# Patient Record
Sex: FEMALE | Race: BLACK OR AFRICAN AMERICAN | NOT HISPANIC OR LATINO
[De-identification: names, ages, dates, MRNs, and addresses within clinical notes are randomized per-mention and may not be internally consistent; named-entity substitution may affect disease eponyms.]

---

## 2016-07-26 RX ORDER — GABAPENTIN 400 MG/1
1 CAPSULE ORAL
Qty: 0 | Refills: 0 | DISCHARGE
Start: 2016-07-26

## 2017-02-14 ENCOUNTER — APPOINTMENT (OUTPATIENT)
Dept: ORTHOPEDIC SURGERY | Facility: CLINIC | Age: 82
End: 2017-02-14

## 2017-02-14 VITALS
HEART RATE: 79 BPM | DIASTOLIC BLOOD PRESSURE: 70 MMHG | HEIGHT: 59 IN | WEIGHT: 215 LBS | SYSTOLIC BLOOD PRESSURE: 115 MMHG | BODY MASS INDEX: 43.34 KG/M2

## 2017-02-14 DIAGNOSIS — T84.9XXA UNSPECIFIED COMPLICATION OF INTERNAL ORTHOPEDIC PROSTHETIC DEVICE, IMPLANT AND GRAFT, INITIAL ENCOUNTER: ICD-10-CM

## 2017-03-26 ENCOUNTER — RESULT REVIEW (OUTPATIENT)
Age: 82
End: 2017-03-26

## 2019-04-17 ENCOUNTER — RESULT REVIEW (OUTPATIENT)
Age: 84
End: 2019-04-17

## 2019-05-01 ENCOUNTER — APPOINTMENT (OUTPATIENT)
Dept: SURGICAL ONCOLOGY | Facility: CLINIC | Age: 84
End: 2019-05-01
Payer: MEDICARE

## 2019-05-01 VITALS
TEMPERATURE: 98.5 F | OXYGEN SATURATION: 95 % | WEIGHT: 215 LBS | SYSTOLIC BLOOD PRESSURE: 167 MMHG | HEART RATE: 76 BPM | RESPIRATION RATE: 17 BRPM | DIASTOLIC BLOOD PRESSURE: 82 MMHG | BODY MASS INDEX: 43.34 KG/M2 | HEIGHT: 59 IN

## 2019-05-01 DIAGNOSIS — N40.0 BENIGN PROSTATIC HYPERPLASIA WITHOUT LOWER URINARY TRACT SYMPMS: ICD-10-CM

## 2019-05-01 DIAGNOSIS — Z80.0 FAMILY HISTORY OF MALIGNANT NEOPLASM OF DIGESTIVE ORGANS: ICD-10-CM

## 2019-05-01 DIAGNOSIS — R01.1 CARDIAC MURMUR, UNSPECIFIED: ICD-10-CM

## 2019-05-01 DIAGNOSIS — Z86.79 PERSONAL HISTORY OF OTHER DISEASES OF THE CIRCULATORY SYSTEM: ICD-10-CM

## 2019-05-01 DIAGNOSIS — E11.9 TYPE 2 DIABETES MELLITUS W/OUT COMPLICATIONS: ICD-10-CM

## 2019-05-01 DIAGNOSIS — Z86.69 PERSONAL HISTORY OF OTHER DISEASES OF THE NERVOUS SYSTEM AND SENSE ORGANS: ICD-10-CM

## 2019-05-01 DIAGNOSIS — Z80.1 FAMILY HISTORY OF MALIGNANT NEOPLASM OF TRACHEA, BRONCHUS AND LUNG: ICD-10-CM

## 2019-05-01 DIAGNOSIS — M19.90 UNSPECIFIED OSTEOARTHRITIS, UNSPECIFIED SITE: ICD-10-CM

## 2019-05-01 DIAGNOSIS — Z85.3 PERSONAL HISTORY OF MALIGNANT NEOPLASM OF BREAST: ICD-10-CM

## 2019-05-01 DIAGNOSIS — Z78.9 OTHER SPECIFIED HEALTH STATUS: ICD-10-CM

## 2019-05-01 PROCEDURE — 99205 OFFICE O/P NEW HI 60 MIN: CPT

## 2019-05-01 RX ORDER — TAFLUPROST 0.01 MG/ML
0 SOLUTION/ DROPS OPHTHALMIC
Refills: 0 | Status: ACTIVE | COMMUNITY

## 2019-05-01 RX ORDER — BRIMONIDINE TARTRATE 2 MG/MG
0.2 SOLUTION/ DROPS OPHTHALMIC
Qty: 10 | Refills: 0 | Status: ACTIVE | COMMUNITY
Start: 2019-02-05

## 2019-05-01 RX ORDER — ASPIRIN 81 MG
81 TABLET, DELAYED RELEASE (ENTERIC COATED) ORAL
Refills: 0 | Status: ACTIVE | COMMUNITY

## 2019-05-01 RX ORDER — AMLODIPINE BESYLATE 5 MG/1
TABLET ORAL
Refills: 0 | Status: ACTIVE | COMMUNITY

## 2019-05-01 NOTE — ASSESSMENT
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

## 2019-05-01 NOTE — CONSULT LETTER
[Dear  ___] : Dear  [unfilled], [Consult Letter:] : I had the pleasure of evaluating your patient, [unfilled]. [Please see my note below.] : Please see my note below. [Consult Closing:] : Thank you very much for allowing me to participate in the care of this patient.  If you have any questions, please do not hesitate to contact me. [Sincerely,] : Sincerely, [FreeTextEntry1] : I will keep you informed of the final pathology. [FreeTextEntry3] : Bi Mcneill MD FACS\par Chief of Surgical Oncology\par \par  [DrJustine  ___] : Dr. HUSAIN

## 2019-05-01 NOTE — PHYSICAL EXAM
[Normal] : supple, no neck mass and thyroid not enlarged [Normal Supraclavicular Lymph Nodes] : normal supraclavicular lymph nodes [Normal Axillary Lymph Nodes] : normal axillary lymph nodes [Normal] : grossly intact [de-identified] : No palpable masses bilateral breasts. [FreeTextEntry1] : Deferred

## 2019-05-01 NOTE — HISTORY OF PRESENT ILLNESS
[de-identified] : Ms. Woodson is a 86 year old female who presents today for an initial consultation.  She  was referred by Dr. Aileen Grossman\par \par Breana states that she has been experiencing occasional Left breast discomfort for approximately 1 year with normal breast imaging last year.  She underwent a mammo/sono on 4/8/19 and was noted with a new 1.7cm spiculated mass in the Left breast 1:00 (Birads 5).  She underwent a US guided biopsy which revealed a Triple Negative invasive poorly differentiated ductal carcinoma measuring at least 11mm.  She then underwent a breast MRI and was noted with nonspecific Left axillary tail lymph nodes for which targeted US evaluation is recommended with potential biopsy (Birads 6).  Today she is without any complaints. Denies palpable breast masses, nipple discharge, skin changes, inversion or breast pain. Denies constitutional symptoms.\par  \par PMH otherwise significant for HTN, DM with neuropathy, glaucoma and irritable bladder.\par Denies family history of breast or ovarian cancer.  Family history of colon cancer in her mother and lung cancer in her father and brother.\par \par Internist:  Dr. Chyna Grady

## 2019-05-09 ENCOUNTER — OUTPATIENT (OUTPATIENT)
Dept: OUTPATIENT SERVICES | Facility: HOSPITAL | Age: 84
LOS: 1 days | End: 2019-05-09
Payer: COMMERCIAL

## 2019-05-09 VITALS
OXYGEN SATURATION: 97 % | RESPIRATION RATE: 16 BRPM | HEART RATE: 88 BPM | DIASTOLIC BLOOD PRESSURE: 78 MMHG | SYSTOLIC BLOOD PRESSURE: 159 MMHG | HEIGHT: 57 IN | WEIGHT: 216.93 LBS | TEMPERATURE: 98 F

## 2019-05-09 DIAGNOSIS — Z98.49 CATARACT EXTRACTION STATUS, UNSPECIFIED EYE: Chronic | ICD-10-CM

## 2019-05-09 DIAGNOSIS — Z96.659 PRESENCE OF UNSPECIFIED ARTIFICIAL KNEE JOINT: Chronic | ICD-10-CM

## 2019-05-09 DIAGNOSIS — Z96.611 PRESENCE OF RIGHT ARTIFICIAL SHOULDER JOINT: Chronic | ICD-10-CM

## 2019-05-09 DIAGNOSIS — Z86.79 PERSONAL HISTORY OF OTHER DISEASES OF THE CIRCULATORY SYSTEM: ICD-10-CM

## 2019-05-09 DIAGNOSIS — Z96.7 PRESENCE OF OTHER BONE AND TENDON IMPLANTS: Chronic | ICD-10-CM

## 2019-05-09 DIAGNOSIS — C50.912 MALIGNANT NEOPLASM OF UNSPECIFIED SITE OF LEFT FEMALE BREAST: ICD-10-CM

## 2019-05-09 DIAGNOSIS — Z98.89 OTHER SPECIFIED POSTPROCEDURAL STATES: Chronic | ICD-10-CM

## 2019-05-09 DIAGNOSIS — Z01.818 ENCOUNTER FOR OTHER PREPROCEDURAL EXAMINATION: ICD-10-CM

## 2019-05-09 DIAGNOSIS — Z96.641 PRESENCE OF RIGHT ARTIFICIAL HIP JOINT: Chronic | ICD-10-CM

## 2019-05-09 DIAGNOSIS — Z90.89 ACQUIRED ABSENCE OF OTHER ORGANS: Chronic | ICD-10-CM

## 2019-05-09 DIAGNOSIS — E11.9 TYPE 2 DIABETES MELLITUS WITHOUT COMPLICATIONS: ICD-10-CM

## 2019-05-09 LAB
ANION GAP SERPL CALC-SCNC: 12 MMOL/L — SIGNIFICANT CHANGE UP (ref 5–17)
BUN SERPL-MCNC: 26 MG/DL — HIGH (ref 7–23)
CALCIUM SERPL-MCNC: 10.2 MG/DL — SIGNIFICANT CHANGE UP (ref 8.4–10.5)
CHLORIDE SERPL-SCNC: 103 MMOL/L — SIGNIFICANT CHANGE UP (ref 96–108)
CO2 SERPL-SCNC: 24 MMOL/L — SIGNIFICANT CHANGE UP (ref 22–31)
CREAT SERPL-MCNC: 1.2 MG/DL — SIGNIFICANT CHANGE UP (ref 0.5–1.3)
GLUCOSE SERPL-MCNC: 235 MG/DL — HIGH (ref 70–99)
POTASSIUM SERPL-MCNC: 5 MMOL/L — SIGNIFICANT CHANGE UP (ref 3.5–5.3)
POTASSIUM SERPL-SCNC: 5 MMOL/L — SIGNIFICANT CHANGE UP (ref 3.5–5.3)
SODIUM SERPL-SCNC: 139 MMOL/L — SIGNIFICANT CHANGE UP (ref 135–145)

## 2019-05-09 PROCEDURE — 83036 HEMOGLOBIN GLYCOSYLATED A1C: CPT

## 2019-05-09 PROCEDURE — 85027 COMPLETE CBC AUTOMATED: CPT

## 2019-05-09 PROCEDURE — 80048 BASIC METABOLIC PNL TOTAL CA: CPT

## 2019-05-09 PROCEDURE — G0463: CPT

## 2019-05-09 RX ORDER — CEFAZOLIN SODIUM 1 G
2000 VIAL (EA) INJECTION ONCE
Refills: 0 | Status: DISCONTINUED | OUTPATIENT
Start: 2019-05-15 | End: 2019-05-30

## 2019-05-09 RX ORDER — LIDOCAINE HCL 20 MG/ML
0.2 VIAL (ML) INJECTION ONCE
Refills: 0 | Status: DISCONTINUED | OUTPATIENT
Start: 2019-05-15 | End: 2019-05-30

## 2019-05-09 RX ORDER — SODIUM CHLORIDE 9 MG/ML
3 INJECTION INTRAMUSCULAR; INTRAVENOUS; SUBCUTANEOUS EVERY 8 HOURS
Refills: 0 | Status: DISCONTINUED | OUTPATIENT
Start: 2019-05-15 | End: 2019-05-30

## 2019-05-09 NOTE — H&P PST ADULT - LYMPHATIC
anterior cervical L/anterior cervical R/supraclavicular R/posterior cervical L/posterior cervical R/supraclavicular L

## 2019-05-09 NOTE — H&P PST ADULT - HISTORY OF PRESENT ILLNESS
85 y/o female H/O HTN, T2DM, OA, C/O left breat discomfort x 1 year, s/p mammogram 4/2019 and fount to have left breast malignant neoplasm and now scheduled for Left Breast Khadijah  Localized Lumpectomy, Left [place] Node Biopsy on 5/15/19. Denies any palpitations, SOB, N/V, fever or chills.

## 2019-05-09 NOTE — H&P PST ADULT - NSICDXPASTSURGICALHX_GEN_ALL_CORE_FT
PAST SURGICAL HISTORY:  H/O total shoulder replacement, right 2016    History of hip replacement, total, right     S/P carpal tunnel release right 15 years ago    S/P cataract extraction bilateral 7 years ago    S/P eye surgery left eye glaucoma surgery, 4 years ago    S/P knee replacement right 1992    S/P knee replacement left 1992    S/P ORIF (open reduction internal fixation) fracture left ankle 2013    S/P T&A (status post tonsillectomy and adenoidectomy) as child

## 2019-05-09 NOTE — H&P PST ADULT - NSICDXPROBLEM_GEN_ALL_CORE_FT
PROBLEM DIAGNOSES  Problem: Malignant neoplasm of left breast  Assessment and Plan: Left Breast Khadijah  Localized Lumpectomy, Left [place] Node Biopsy on 5/15/19  Pre-op education provided - all questions answered     Problem: H/O: HTN (hypertension)  Assessment and Plan: continue on antihypertensive medications     Problem: T2DM (type 2 diabetes mellitus)  Assessment and Plan: Finger stick on day of surgery

## 2019-05-09 NOTE — H&P PST ADULT - RS GEN PE MLT RESP DETAILS PC
clear to auscultation bilaterally/respirations non-labored/no wheezes/breath sounds equal/good air movement/no rales/no rhonchi/airway patent

## 2019-05-09 NOTE — H&P PST ADULT - NSICDXPASTMEDICALHX_GEN_ALL_CORE_FT
PAST MEDICAL HISTORY:  Diabetic nephropathy     Glaucoma     Heart murmur     Hyperlipidemia     Hypertension     Malignant neoplasm of left breast     Neuropathy bilateral hands    Osteoarthritis     Overactive bladder     Renal insufficiency     Type 2 diabetes mellitus PAST MEDICAL HISTORY:  Diabetic nephropathy     Glaucoma     Heart murmur     Hyperlipidemia     Hypertension     Malignant neoplasm of left breast     Morbidly obese BMI 47    Neuropathy bilateral hands    Osteoarthritis     Overactive bladder     Renal insufficiency     Type 2 diabetes mellitus

## 2019-05-09 NOTE — H&P PST ADULT - NSANTHOSAYNRD_GEN_A_CORE
No. OSCAR screening performed.  STOP BANG Legend: 0-2 = LOW Risk; 3-4 = INTERMEDIATE Risk; 5-8 = HIGH Risk

## 2019-05-10 LAB
HBA1C BLD-MCNC: 6.5 % — HIGH (ref 4–5.6)
HCT VFR BLD CALC: 46.2 % — HIGH (ref 34.5–45)
HGB BLD-MCNC: 13.8 G/DL — SIGNIFICANT CHANGE UP (ref 11.5–15.5)
MCHC RBC-ENTMCNC: 28 PG — SIGNIFICANT CHANGE UP (ref 27–34)
MCHC RBC-ENTMCNC: 29.9 GM/DL — LOW (ref 32–36)
MCV RBC AUTO: 93.7 FL — SIGNIFICANT CHANGE UP (ref 80–100)
PLATELET # BLD AUTO: 152 K/UL — SIGNIFICANT CHANGE UP (ref 150–400)
RBC # BLD: 4.93 M/UL — SIGNIFICANT CHANGE UP (ref 3.8–5.2)
RBC # FLD: 13.9 % — SIGNIFICANT CHANGE UP (ref 10.3–14.5)
WBC # BLD: 6.22 K/UL — SIGNIFICANT CHANGE UP (ref 3.8–10.5)
WBC # FLD AUTO: 6.22 K/UL — SIGNIFICANT CHANGE UP (ref 3.8–10.5)

## 2019-05-13 ENCOUNTER — FORM ENCOUNTER (OUTPATIENT)
Age: 84
End: 2019-05-13

## 2019-05-14 ENCOUNTER — OUTPATIENT (OUTPATIENT)
Dept: OUTPATIENT SERVICES | Facility: HOSPITAL | Age: 84
LOS: 1 days | End: 2019-05-14
Payer: COMMERCIAL

## 2019-05-14 ENCOUNTER — FORM ENCOUNTER (OUTPATIENT)
Age: 84
End: 2019-05-14

## 2019-05-14 ENCOUNTER — APPOINTMENT (OUTPATIENT)
Dept: ULTRASOUND IMAGING | Facility: IMAGING CENTER | Age: 84
End: 2019-05-14
Payer: MEDICARE

## 2019-05-14 ENCOUNTER — TRANSCRIPTION ENCOUNTER (OUTPATIENT)
Age: 84
End: 2019-05-14

## 2019-05-14 DIAGNOSIS — Z96.7 PRESENCE OF OTHER BONE AND TENDON IMPLANTS: Chronic | ICD-10-CM

## 2019-05-14 DIAGNOSIS — Z00.8 ENCOUNTER FOR OTHER GENERAL EXAMINATION: ICD-10-CM

## 2019-05-14 DIAGNOSIS — Z96.611 PRESENCE OF RIGHT ARTIFICIAL SHOULDER JOINT: Chronic | ICD-10-CM

## 2019-05-14 DIAGNOSIS — Z90.89 ACQUIRED ABSENCE OF OTHER ORGANS: Chronic | ICD-10-CM

## 2019-05-14 DIAGNOSIS — Z98.89 OTHER SPECIFIED POSTPROCEDURAL STATES: Chronic | ICD-10-CM

## 2019-05-14 DIAGNOSIS — Z96.659 PRESENCE OF UNSPECIFIED ARTIFICIAL KNEE JOINT: Chronic | ICD-10-CM

## 2019-05-14 DIAGNOSIS — Z96.641 PRESENCE OF RIGHT ARTIFICIAL HIP JOINT: Chronic | ICD-10-CM

## 2019-05-14 DIAGNOSIS — Z98.49 CATARACT EXTRACTION STATUS, UNSPECIFIED EYE: Chronic | ICD-10-CM

## 2019-05-14 PROBLEM — E66.01 MORBID (SEVERE) OBESITY DUE TO EXCESS CALORIES: Chronic | Status: ACTIVE | Noted: 2019-05-09

## 2019-05-14 PROBLEM — C50.912 MALIGNANT NEOPLASM OF UNSPECIFIED SITE OF LEFT FEMALE BREAST: Chronic | Status: ACTIVE | Noted: 2019-05-09

## 2019-05-14 PROCEDURE — 19285 PERQ DEV BREAST 1ST US IMAG: CPT | Mod: LT

## 2019-05-14 PROCEDURE — 19285 PERQ DEV BREAST 1ST US IMAG: CPT

## 2019-05-14 PROCEDURE — C1739: CPT

## 2019-05-14 RX ORDER — SODIUM CHLORIDE 9 MG/ML
1000 INJECTION, SOLUTION INTRAVENOUS
Refills: 0 | Status: DISCONTINUED | OUTPATIENT
Start: 2019-05-15 | End: 2019-05-30

## 2019-05-14 RX ORDER — ONDANSETRON 8 MG/1
4 TABLET, FILM COATED ORAL ONCE
Refills: 0 | Status: DISCONTINUED | OUTPATIENT
Start: 2019-05-15 | End: 2019-05-30

## 2019-05-15 ENCOUNTER — RESULT REVIEW (OUTPATIENT)
Age: 84
End: 2019-05-15

## 2019-05-15 ENCOUNTER — OUTPATIENT (OUTPATIENT)
Dept: OUTPATIENT SERVICES | Facility: HOSPITAL | Age: 84
LOS: 1 days | End: 2019-05-15
Payer: COMMERCIAL

## 2019-05-15 ENCOUNTER — APPOINTMENT (OUTPATIENT)
Dept: SURGICAL ONCOLOGY | Facility: HOSPITAL | Age: 84
End: 2019-05-15

## 2019-05-15 ENCOUNTER — APPOINTMENT (OUTPATIENT)
Dept: NUCLEAR MEDICINE | Facility: HOSPITAL | Age: 84
End: 2019-05-15

## 2019-05-15 VITALS
RESPIRATION RATE: 16 BRPM | OXYGEN SATURATION: 97 % | WEIGHT: 216.93 LBS | SYSTOLIC BLOOD PRESSURE: 159 MMHG | HEART RATE: 88 BPM | DIASTOLIC BLOOD PRESSURE: 83 MMHG | HEIGHT: 57 IN | TEMPERATURE: 98 F

## 2019-05-15 VITALS
OXYGEN SATURATION: 99 % | DIASTOLIC BLOOD PRESSURE: 77 MMHG | HEART RATE: 65 BPM | SYSTOLIC BLOOD PRESSURE: 147 MMHG | RESPIRATION RATE: 12 BRPM

## 2019-05-15 DIAGNOSIS — Z98.89 OTHER SPECIFIED POSTPROCEDURAL STATES: Chronic | ICD-10-CM

## 2019-05-15 DIAGNOSIS — Z90.89 ACQUIRED ABSENCE OF OTHER ORGANS: Chronic | ICD-10-CM

## 2019-05-15 DIAGNOSIS — Z96.7 PRESENCE OF OTHER BONE AND TENDON IMPLANTS: Chronic | ICD-10-CM

## 2019-05-15 DIAGNOSIS — Z96.659 PRESENCE OF UNSPECIFIED ARTIFICIAL KNEE JOINT: Chronic | ICD-10-CM

## 2019-05-15 DIAGNOSIS — Z98.49 CATARACT EXTRACTION STATUS, UNSPECIFIED EYE: Chronic | ICD-10-CM

## 2019-05-15 DIAGNOSIS — Z96.641 PRESENCE OF RIGHT ARTIFICIAL HIP JOINT: Chronic | ICD-10-CM

## 2019-05-15 DIAGNOSIS — Z96.611 PRESENCE OF RIGHT ARTIFICIAL SHOULDER JOINT: Chronic | ICD-10-CM

## 2019-05-15 DIAGNOSIS — C50.912 MALIGNANT NEOPLASM OF UNSPECIFIED SITE OF LEFT FEMALE BREAST: ICD-10-CM

## 2019-05-15 PROCEDURE — 14000 TIS TRNFR TRUNK 10 SQ CM/<: CPT

## 2019-05-15 PROCEDURE — 19301 PARTIAL MASTECTOMY: CPT | Mod: LT

## 2019-05-15 PROCEDURE — 76098 X-RAY EXAM SURGICAL SPECIMEN: CPT | Mod: 26

## 2019-05-15 PROCEDURE — 19302 P-MASTECTOMY W/LN REMOVAL: CPT | Mod: LT

## 2019-05-15 PROCEDURE — 38308 INCISION OF LYMPH CHANNELS: CPT

## 2019-05-15 PROCEDURE — 38792 RA TRACER ID OF SENTINL NODE: CPT

## 2019-05-15 PROCEDURE — 88305 TISSUE EXAM BY PATHOLOGIST: CPT

## 2019-05-15 PROCEDURE — 38900 IO MAP OF SENT LYMPH NODE: CPT | Mod: LT

## 2019-05-15 PROCEDURE — 38525 BIOPSY/REMOVAL LYMPH NODES: CPT | Mod: LT

## 2019-05-15 PROCEDURE — 38792 RA TRACER ID OF SENTINL NODE: CPT | Mod: 59,LT

## 2019-05-15 PROCEDURE — 88307 TISSUE EXAM BY PATHOLOGIST: CPT | Mod: 26

## 2019-05-15 PROCEDURE — 88305 TISSUE EXAM BY PATHOLOGIST: CPT | Mod: 26

## 2019-05-15 PROCEDURE — A9541: CPT

## 2019-05-15 PROCEDURE — 88307 TISSUE EXAM BY PATHOLOGIST: CPT

## 2019-05-15 PROCEDURE — 76098 X-RAY EXAM SURGICAL SPECIMEN: CPT

## 2019-05-15 RX ORDER — ACETAMINOPHEN WITH CODEINE 300MG-30MG
1 TABLET ORAL
Qty: 12 | Refills: 0
Start: 2019-05-15

## 2019-05-15 RX ORDER — APREPITANT 80 MG/1
40 CAPSULE ORAL ONCE
Refills: 0 | Status: COMPLETED | OUTPATIENT
Start: 2019-05-15 | End: 2019-05-15

## 2019-05-15 RX ADMIN — SODIUM CHLORIDE 3 MILLILITER(S): 9 INJECTION INTRAMUSCULAR; INTRAVENOUS; SUBCUTANEOUS at 12:00

## 2019-05-15 RX ADMIN — APREPITANT 40 MILLIGRAM(S): 80 CAPSULE ORAL at 12:00

## 2019-05-15 NOTE — BRIEF OPERATIVE NOTE - OPERATION/FINDINGS
Left breast lumpectomy with left axillary lymphadenectomy. Khadijah  localization for lumpectomy. Blue dye and radiotracer ID of axillary lymph nodes.

## 2019-05-15 NOTE — ASU PATIENT PROFILE, ADULT - PMH
Diabetic nephropathy    Glaucoma    Heart murmur    Hyperlipidemia    Hypertension    Malignant neoplasm of left breast    Morbidly obese  BMI 47  Neuropathy  bilateral hands  Osteoarthritis    Overactive bladder    Renal insufficiency    Type 2 diabetes mellitus

## 2019-05-15 NOTE — ASU DISCHARGE PLAN (ADULT/PEDIATRIC) - CALL YOUR DOCTOR IF YOU HAVE ANY OF THE FOLLOWING:
Swelling that gets worse/Pain not relieved by Medications/Bleeding that does not stop/Fever greater than (need to indicate Fahrenheit or Celsius)/Wound/Surgical Site with redness, or foul smelling discharge or pus

## 2019-05-15 NOTE — ASU DISCHARGE PLAN (ADULT/PEDIATRIC) - CARE PROVIDER_API CALL
Bi Mcneill (MD)  Surgery  [address] [place] [id number]  Phone: [phone]  Fax: [phone]  Follow Up Time:

## 2019-05-15 NOTE — ASU DISCHARGE PLAN (ADULT/PEDIATRIC) - ASU DC SPECIAL INSTRUCTIONSFT
WOUND CARE:  Please keep incisions clean and dry. Please do not Scrub or rub incisions. Do not use lotion or powder on incisions.   BATHING: Please do not submerge wound underwater. You may shower and/or sponge bathe.  ACTIVITY: No heavy lifting or straining until your follow up appointment. Otherwise, you may return to your usual level of physical activity. If you are taking narcotic pain medication (such as Percocet) DO NOT drive a car, operate machinery or make important decisions.  DIET: Return to your usual diet.  NOTIFY YOUR SURGEON IF: You have any bleeding that does not stop, any pus draining from your wound(s), any fever (over 100.4 F) or chills, persistent nausea/vomiting, persistent diarrhea, or if your pain is not controlled on your discharge pain medications.  FOLLOW-UP: Please follow-up with your surgeon 2 weeks following discharge- please call to schedule an appointment.   PAIN CONTROL: A prescription for Tylenol with codeine has been sent to your pharmacy ([facility], [address]). You should only take these for severe pain. For mild or moderate pain, you may take ibuprofen 400mg every 6 hours.

## 2019-05-15 NOTE — ASU PATIENT PROFILE, ADULT - PSH
H/O total shoulder replacement, right  2016  History of hip replacement, total, right    S/P carpal tunnel release  right 15 years ago  S/P cataract extraction  bilateral 7 years ago  S/P eye surgery  left eye glaucoma surgery, 4 years ago  S/P knee replacement  left 1992  S/P knee replacement  right 1992  S/P ORIF (open reduction internal fixation) fracture  left ankle 2013  S/P T&A (status post tonsillectomy and adenoidectomy)  as child

## 2019-05-21 LAB — SURGICAL PATHOLOGY STUDY: SIGNIFICANT CHANGE UP

## 2019-05-28 ENCOUNTER — APPOINTMENT (OUTPATIENT)
Dept: SURGICAL ONCOLOGY | Facility: CLINIC | Age: 84
End: 2019-05-28
Payer: MEDICARE

## 2019-05-28 VITALS
HEART RATE: 94 BPM | SYSTOLIC BLOOD PRESSURE: 138 MMHG | WEIGHT: 215 LBS | BODY MASS INDEX: 43.34 KG/M2 | HEIGHT: 59 IN | RESPIRATION RATE: 15 BRPM | DIASTOLIC BLOOD PRESSURE: 80 MMHG

## 2019-05-28 PROCEDURE — 99214 OFFICE O/P EST MOD 30 MIN: CPT | Mod: 24

## 2019-05-28 NOTE — CONSULT LETTER
[Dear  ___] : Dear  [unfilled], [Courtesy Letter:] : I had the pleasure of seeing your patient, [unfilled], in my office today. [Please see my note below.] : Please see my note below. [Consult Closing:] : Thank you very much for allowing me to participate in the care of this patient.  If you have any questions, please do not hesitate to contact me. [FreeTextEntry1] : I will keep you informed of my follow-up. [Sincerely,] : Sincerely, [FreeTextEntry3] : Bi Mcneill MD FACS\par Chief of Surgical Oncology\par \par  [DrJustine  ___] : Dr. HUSAIN

## 2019-05-28 NOTE — HISTORY OF PRESENT ILLNESS
[de-identified] : 86 year-old female presents for an initial postop visit, status post left breast lumpectomy and SLNB on 5/15/19.  Final pathology was 1.6 cm invasive ductal carcinoma and DCIS, with 6 benign sentinel lymph nodes and negative margins (T1cN0, ER/WI/HER2 negative).  She denies significant pain, fever, chills, erythema or drainage from the incisions. \par \par Previous pertinent history is as follows:\par \par Breana states that she has been experiencing occasional Left breast discomfort for approximately 1 year with normal breast imaging last year.  She underwent a mammo/sono on 4/8/19 and was noted with a new 1.7cm spiculated mass in the Left breast 1:00 (Birads 5).  She underwent a US guided biopsy which revealed a Triple Negative invasive poorly differentiated ductal carcinoma measuring at least 11mm.  She then underwent a breast MRI and was noted with nonspecific Left axillary tail lymph nodes for which targeted US evaluation is recommended with potential biopsy (Birads 6).  Today she is without any complaints. Denies palpable breast masses, nipple discharge, skin changes, inversion or breast pain. Denies constitutional symptoms.\par  \par PMH otherwise significant for HTN, DM with neuropathy, glaucoma and irritable bladder.\par Denies family history of breast or ovarian cancer.  Family history of colon cancer in her mother and lung cancer in her father and brother.\par \par Internist:  Dr. Chyna Grady

## 2019-05-28 NOTE — PHYSICAL EXAM
[de-identified] : Left breast and left axillary incisions healing well with no evidence of infection, hematoma or seroma.

## 2019-05-28 NOTE — ASSESSMENT
[FreeTextEntry1] : IMP:\par S/p left breast lumpectomy for stage 1 triple negative left breast cancer\par \par PLAN:\par Adjuvant RT\par No role for endocrine therapy\par Adjuvant chemotherapy discussed and declined by pt. and family\par RTO q3 months with bloodwork

## 2019-05-31 ENCOUNTER — OUTPATIENT (OUTPATIENT)
Dept: OUTPATIENT SERVICES | Facility: HOSPITAL | Age: 84
LOS: 1 days | Discharge: HOME | End: 2019-05-31
Payer: MEDICARE

## 2019-05-31 DIAGNOSIS — Z96.7 PRESENCE OF OTHER BONE AND TENDON IMPLANTS: Chronic | ICD-10-CM

## 2019-05-31 DIAGNOSIS — Z96.641 PRESENCE OF RIGHT ARTIFICIAL HIP JOINT: Chronic | ICD-10-CM

## 2019-05-31 DIAGNOSIS — Z98.49 CATARACT EXTRACTION STATUS, UNSPECIFIED EYE: Chronic | ICD-10-CM

## 2019-05-31 DIAGNOSIS — Z96.611 PRESENCE OF RIGHT ARTIFICIAL SHOULDER JOINT: Chronic | ICD-10-CM

## 2019-05-31 DIAGNOSIS — Z90.89 ACQUIRED ABSENCE OF OTHER ORGANS: Chronic | ICD-10-CM

## 2019-05-31 DIAGNOSIS — Z96.659 PRESENCE OF UNSPECIFIED ARTIFICIAL KNEE JOINT: Chronic | ICD-10-CM

## 2019-05-31 DIAGNOSIS — Z98.89 OTHER SPECIFIED POSTPROCEDURAL STATES: Chronic | ICD-10-CM

## 2019-06-04 ENCOUNTER — APPOINTMENT (OUTPATIENT)
Dept: RADIATION ONCOLOGY | Facility: CLINIC | Age: 84
End: 2019-06-04
Payer: MEDICARE

## 2019-06-04 VITALS
HEART RATE: 70 BPM | DIASTOLIC BLOOD PRESSURE: 82 MMHG | OXYGEN SATURATION: 95 % | SYSTOLIC BLOOD PRESSURE: 173 MMHG | RESPIRATION RATE: 16 BRPM | BODY MASS INDEX: 43.43 KG/M2 | WEIGHT: 215 LBS

## 2019-06-04 DIAGNOSIS — Z80.0 FAMILY HISTORY OF MALIGNANT NEOPLASM OF DIGESTIVE ORGANS: ICD-10-CM

## 2019-06-04 DIAGNOSIS — N32.89 OTHER SPECIFIED DISORDERS OF BLADDER: ICD-10-CM

## 2019-06-04 PROCEDURE — 99204 OFFICE O/P NEW MOD 45 MIN: CPT | Mod: 25

## 2019-06-04 PROCEDURE — 77263 THER RADIOLOGY TX PLNG CPLX: CPT

## 2019-06-04 NOTE — OB/GYN HISTORY
[Currently In Menopause] : currently in menopause [Menopause Age: ____] : patient was [unfilled] years old at menopause [___] : Living: [unfilled]

## 2019-06-06 PROCEDURE — 77290 THER RAD SIMULAJ FIELD CPLX: CPT | Mod: 26

## 2019-06-06 PROCEDURE — 77333 RADIATION TREATMENT AID(S): CPT | Mod: 26

## 2019-06-13 PROCEDURE — 77334 RADIATION TREATMENT AID(S): CPT | Mod: 26

## 2019-06-13 PROCEDURE — 77300 RADIATION THERAPY DOSE PLAN: CPT | Mod: 26

## 2019-06-13 PROCEDURE — 77295 3-D RADIOTHERAPY PLAN: CPT | Mod: 26

## 2019-06-13 NOTE — VITALS
[Maximal Pain Intensity: 0/10] : 0/10 [Least Pain Intensity: 0/10] : 0/10 [5 - Distress Level] : Distress Level: 5 [80: Normal activity with effort; some signs or symptoms of disease.] : 80: Normal activity with effort; some signs or symptoms of disease.

## 2019-06-14 PROCEDURE — 77280 THER RAD SIMULAJ FIELD SMPL: CPT | Mod: 26

## 2019-06-17 NOTE — REVIEW OF SYSTEMS
[Genital Edema: Grade 0] : Genital Edema: Grade 0 [Vaginal Stricture: Grade 0] : Vaginal Stricture: Grade 0 [Breast Pain: Grade 0] : Breast Pain: Grade 0

## 2019-06-21 PROCEDURE — 77427 RADIATION TX MANAGEMENT X5: CPT

## 2019-06-24 NOTE — PHYSICAL EXAM
[General Appearance - Alert] : alert [General Appearance - In No Acute Distress] : in no acute distress [Skin Color & Pigmentation] : normal skin color and pigmentation [Obese] : obese [Oriented To Time, Place, And Person] : oriented to person, place, and time

## 2019-06-28 PROCEDURE — 77427 RADIATION TX MANAGEMENT X5: CPT

## 2019-06-30 NOTE — DISEASE MANAGEMENT
[Pathological] : TNM Stage: p [I] : I [TTNM] : 1c [NTNM] : 0 [MTNM] : x [de-identified] : 0 fx [de-identified] : 20 fx [de-identified] : left breast

## 2019-06-30 NOTE — PHYSICAL EXAM
[Normal] : well developed, well nourished, in no acute distress [General Appearance - Well Developed] : well developed [Breast Palpation Mass] : no palpable masses [de-identified] : incision to left breast, well-approximated, clean, dry, intact.

## 2019-06-30 NOTE — HISTORY OF PRESENT ILLNESS
[FreeTextEntry1] : 86 year old female with invasive ductal carcinoma of the breast, currently undergoing radiation therapy.\par \par \par 6/24/19- 6/20 fx\par Feeling well. Denies pain or skin irritation of treated area.\par \par 6/17/19\par -patient denies pain or discomfort\par -slight raised area under surgical incision

## 2019-06-30 NOTE — REVIEW OF SYSTEMS
[Fatigue: Grade 0] : Fatigue: Grade 0 [Genital Edema: Grade 0] : Genital Edema: Grade 0 [Vaginal Stricture: Grade 0] : Vaginal Stricture: Grade 0 [Breast Pain: Grade 0] : Breast Pain: Grade 0 [Skin Hyperpigmentation: Grade 0] : Skin Hyperpigmentation: Grade 0 [Dermatitis Radiation: Grade 0] : Dermatitis Radiation: Grade 0 [Skin Induration: Grade 1 - Mild induration, able to move skin parallel to plane (sliding) and perpendicular to skin (pinching up)] : Skin Induration: Grade 1 - Mild induration, able to move skin parallel to plane (sliding) and perpendicular to skin (pinching up) [FreeTextEntry5] : along incision line

## 2019-06-30 NOTE — HISTORY OF PRESENT ILLNESS
[FreeTextEntry1] : 86 year old female with invasive ductal carcinoma of the breast, currently undergoing radiation therapy.\par \par 6/17/19\par -patient denies pain or discomfort\par -slight raised area under surgical incision

## 2019-06-30 NOTE — DISEASE MANAGEMENT
[Pathological] : TNM Stage: p [I] : I [TTNM] : 1c [NTNM] : 0 [MTNM] : x [de-identified] : 0 fx [de-identified] : 20 fx [de-identified] : left breast

## 2019-07-02 PROCEDURE — 77280 THER RAD SIMULAJ FIELD SMPL: CPT | Mod: 26

## 2019-07-04 NOTE — REVIEW OF SYSTEMS
[Fatigue: Grade 0] : Fatigue: Grade 0 [Genital Edema: Grade 0] : Genital Edema: Grade 0 [Vaginal Stricture: Grade 0] : Vaginal Stricture: Grade 0 [Breast Pain: Grade 0] : Breast Pain: Grade 0 [Skin Induration: Grade 1 - Mild induration, able to move skin parallel to plane (sliding) and perpendicular to skin (pinching up)] : Skin Induration: Grade 1 - Mild induration, able to move skin parallel to plane (sliding) and perpendicular to skin (pinching up) [Skin Hyperpigmentation: Grade 1 - Hyperpigmentation covering <10% BSA; no psychosocial impact] : Skin Hyperpigmentation: Grade 1 - Hyperpigmentation covering <10% BSA; no psychosocial impact [Dermatitis Radiation: Grade 1 - Faint erythema or dry desquamation] : Dermatitis Radiation: Grade 1 - Faint erythema or dry desquamation [FreeTextEntry5] : along incision line

## 2019-07-04 NOTE — PHYSICAL EXAM
[General Appearance - Alert] : alert [General Appearance - In No Acute Distress] : in no acute distress [Obese] : obese [Oriented To Time, Place, And Person] : oriented to person, place, and time [de-identified] : mild to moderate erythema / hyperpigmentation to treated breast

## 2019-07-04 NOTE — DISEASE MANAGEMENT
[Pathological] : TNM Stage: p [I] : I [TTNM] : 1c [NTNM] : 0 [MTNM] : x [de-identified] : 0 fx [de-identified] : 20 fx [de-identified] : left breast

## 2019-07-08 PROCEDURE — 77427 RADIATION TX MANAGEMENT X5: CPT

## 2019-07-11 NOTE — DISEASE MANAGEMENT
[Pathological] : TNM Stage: p [I] : I [TTNM] : 1c [NTNM] : 0 [MTNM] : x [de-identified] : 0 fx [de-identified] : 20 fx [de-identified] : left breast

## 2019-07-11 NOTE — REVIEW OF SYSTEMS
[Fatigue: Grade 0] : Fatigue: Grade 0 [Breast Pain: Grade 0] : Breast Pain: Grade 0 [Skin Induration: Grade 1 - Mild induration, able to move skin parallel to plane (sliding) and perpendicular to skin (pinching up)] : Skin Induration: Grade 1 - Mild induration, able to move skin parallel to plane (sliding) and perpendicular to skin (pinching up) [Skin Hyperpigmentation: Grade 1 - Hyperpigmentation covering <10% BSA; no psychosocial impact] : Skin Hyperpigmentation: Grade 1 - Hyperpigmentation covering <10% BSA; no psychosocial impact [Dermatitis Radiation: Grade 1 - Faint erythema or dry desquamation] : Dermatitis Radiation: Grade 1 - Faint erythema or dry desquamation [Dyspnea: Grade 0] : Dyspnea: Grade 0 [FreeTextEntry5] : along incision line

## 2019-07-11 NOTE — PHYSICAL EXAM
[General Appearance - Alert] : alert [Oriented To Time, Place, And Person] : oriented to person, place, and time [Obese] : obese [General Appearance - In No Acute Distress] : in no acute distress [de-identified] :  moderate erythema / hyperpigmentation to treated breast

## 2019-07-11 NOTE — HISTORY OF PRESENT ILLNESS
[FreeTextEntry1] : 86 year old female with invasive ductal carcinoma of the breast, currently undergoing radiation therapy.\par \par 7/8/19- 15/50 fx\par Notes slight sensitivity to treated breast.  Using aquaphor to treated area\par \par 7/1/19 -11/20 fx\par Feeling well. No complaints. Using aquaphor to treated area.\par \par 6/24/19- 6/20 fx\par Feeling well. Denies pain or skin irritation of treated area.\par \par 6/17/19\par -patient denies pain or discomfort\par -slight raised area under surgical incision

## 2019-07-12 NOTE — REVIEW OF SYSTEMS
[Patient Intake Form Reviewed] : Patient intake form was reviewed [Joint Pain] : joint pain [Difficulty Walking] : difficulty walking [Negative] : Heme/Lymph [Gait Disturbance] : gait disturbance [FreeTextEntry8] : irritable bladder with incontinence [FreeTextEntry9] : hx of arthritis, uses cane [de-identified] : s/p left  lumpectomy and axillary node biopsy with healed incisions  [de-identified] : diabetic neuropathy, uses cane

## 2019-07-12 NOTE — HISTORY OF PRESENT ILLNESS
[FreeTextEntry1] : Ms. Breana Woodson is an 86  year old post menopausal female who presents with pT1c pN0, ER/NC/ Her-2  negative poorly differentiated invasive ductal carcinoma of the left breast. \par  \par She stated that she experienced occasional left breast discomfort in 3/2018 with normal breast imaging last year.\par Diagnostic bilateral mammogram on 4/8/19 demonstrated a 1.9 cm spiculated mass in the left upper outer quadrant. Ultrasound on the same date showed a 1.7 x 1.4 x 1.3 cm irregular hypoechoic mass at 1:00 6 cm FN corresponding to mammographic mass. There were retroareolar ectatic ducts on the right without change since 2010.\par \par Ultrasound-guided core biopsy of the left breast 1:00 on 4/17/19 showed invasive ductal carcinoma, measuring at least  11 mm , Edgard score 6/9.  There was no  lymphovascular invasion. The cancer was ER negative ( 0%), NC negative ( 0 %), HER-2 negative.\par \par Breast MRI on  4/24/19 showed in the  breast at 1:00  5 cm FN a 26 x 21 x 22  mm mass consistent with biopsy-proven  carcinoma. Nonspecific left axillary tail lymph nodes. Targeted ultrasound of the left axilla recommended.  There was no  MRI evidence of suspicious enhancing masses in the right breast. \par \par A second look ultrasound of the left axilla performed on 5/14/19  which showed morphologically normal appearing lymph nodes.\par \par She underwent a left breast MATY  lumpectomy and sentinel lymph node biopsy on by Dr. Mcneill on 5/15/19.\par Pathology report disclosed a unifocal 1.6 cm SBR 8/9 invasive poorly differentiated ductal carcinoma. There was high nuclear grade DCIS measuring 1.8 cm in 6 of 11 blocks, solid and micropapillary type with necrosis.  The surgical margins were negative. The fragmented nature of the final margin specimens preclude assessment of the distance between tumor and nearest margin for invasive disease and DCIS.  Additional breast tissues taken from the left superior, medial, inferior, lateral and deep margins were negative for residual disease.  [place] node biopsy was negative (0/6).  LVI was not identified. Per pathology report, pathologic staging is pT1c pN0.  \par \par She followed up with Dr. Mcneill for a discussion on pathology findings and management.  Patient has decided not to have chemotherapy.  She was referred for adjuvant radiation therapy.\par \par Today she states that she feels well. Denies breast pain / mass. \par Her family history of malignancies involves her mother with colon cancer, father with esophageal and brother with lung cancer.

## 2019-07-15 PROCEDURE — 77427 RADIATION TX MANAGEMENT X5: CPT

## 2019-07-21 NOTE — HISTORY OF PRESENT ILLNESS
[FreeTextEntry1] : 86 year old female with invasive ductal carcinoma of the breast, currently undergoing radiation therapy.\par \par 7/15/19-20/20 fx\par Feeling well. Completed RT.  No new complaints. Using aquaphor to treated area\par \par 7/8/19- 15/20 fx\par Notes slight sensitivity to treated breast.  Using aquaphor to treated area\par \par 7/1/19 -11/20 fx\par Feeling well. No complaints. Using aquaphor to treated area.\par \par 6/24/19- 6/20 fx\par Feeling well. Denies pain or skin irritation of treated area.\par \par 6/17/19\par -patient denies pain or discomfort\par -slight raised area under surgical incision

## 2019-07-21 NOTE — REVIEW OF SYSTEMS
[Fatigue: Grade 0] : Fatigue: Grade 0 [Breast Pain: Grade 0] : Breast Pain: Grade 0 [Skin Hyperpigmentation: Grade 1 - Hyperpigmentation covering <10% BSA; no psychosocial impact] : Skin Hyperpigmentation: Grade 1 - Hyperpigmentation covering <10% BSA; no psychosocial impact [Dyspnea: Grade 0] : Dyspnea: Grade 0 [Skin Induration: Grade 1 - Mild induration, able to move skin parallel to plane (sliding) and perpendicular to skin (pinching up)] : Skin Induration: Grade 1 - Mild induration, able to move skin parallel to plane (sliding) and perpendicular to skin (pinching up) [Dermatitis Radiation: Grade 1 - Faint erythema or dry desquamation] : Dermatitis Radiation: Grade 1 - Faint erythema or dry desquamation [FreeTextEntry5] : along incision line

## 2019-07-21 NOTE — PHYSICAL EXAM
[General Appearance - Alert] : alert [General Appearance - In No Acute Distress] : in no acute distress [Obese] : obese [] : no respiratory distress [Oriented To Time, Place, And Person] : oriented to person, place, and time [de-identified] :  moderate hyperpigmentation to treated breast,  2 areas of skin peeling (2 mm) inframammary fold

## 2019-08-26 ENCOUNTER — APPOINTMENT (OUTPATIENT)
Dept: SURGICAL ONCOLOGY | Facility: CLINIC | Age: 84
End: 2019-08-26
Payer: MEDICARE

## 2019-08-26 VITALS
OXYGEN SATURATION: 90 % | HEIGHT: 59 IN | DIASTOLIC BLOOD PRESSURE: 85 MMHG | RESPIRATION RATE: 16 BRPM | HEART RATE: 85 BPM | WEIGHT: 210 LBS | SYSTOLIC BLOOD PRESSURE: 181 MMHG | BODY MASS INDEX: 42.33 KG/M2

## 2019-08-26 PROCEDURE — 99214 OFFICE O/P EST MOD 30 MIN: CPT

## 2019-08-26 RX ORDER — LISINOPRIL 40 MG/1
40 TABLET ORAL
Qty: 90 | Refills: 0 | Status: ACTIVE | COMMUNITY
Start: 2019-07-24

## 2019-08-26 RX ORDER — ISOPROPYL ALCOHOL 70 ML/100ML
70 SWAB TOPICAL
Qty: 100 | Refills: 0 | Status: ACTIVE | COMMUNITY
Start: 2018-09-13

## 2019-08-26 RX ORDER — BLOOD SUGAR DIAGNOSTIC
STRIP MISCELLANEOUS
Qty: 50 | Refills: 0 | Status: ACTIVE | COMMUNITY
Start: 2018-12-13

## 2019-08-26 RX ORDER — METOPROLOL SUCCINATE 25 MG/1
25 TABLET, EXTENDED RELEASE ORAL
Qty: 90 | Refills: 0 | Status: ACTIVE | COMMUNITY
Start: 2019-05-14

## 2019-08-26 RX ORDER — TOLTERODINE TARTRATE 4 MG/1
4 CAPSULE, EXTENDED RELEASE ORAL
Qty: 90 | Refills: 0 | Status: ACTIVE | COMMUNITY
Start: 2019-04-10

## 2019-08-26 RX ORDER — OMEPRAZOLE 20 MG/1
20 CAPSULE, DELAYED RELEASE ORAL
Qty: 90 | Refills: 0 | Status: ACTIVE | COMMUNITY
Start: 2019-02-25

## 2019-08-26 RX ORDER — ACETAMINOPHEN AND CODEINE PHOSPHATE 300; 15 MG/1; MG/1
300-15 TABLET ORAL
Qty: 12 | Refills: 0 | Status: ACTIVE | COMMUNITY
Start: 2019-05-15

## 2019-08-26 RX ORDER — LANCETS 28 GAUGE
EACH MISCELLANEOUS
Qty: 100 | Refills: 0 | Status: ACTIVE | COMMUNITY
Start: 2018-12-14

## 2019-08-26 RX ORDER — GLIPIZIDE 2.5 MG/1
2.5 TABLET, FILM COATED, EXTENDED RELEASE ORAL
Qty: 90 | Refills: 0 | Status: ACTIVE | COMMUNITY
Start: 2019-05-14

## 2019-08-26 RX ORDER — DORZOLAMIDE HYDROCHLORIDE TIMOLOL MALEATE 20; 5 MG/ML; MG/ML
22.3-6.8 SOLUTION/ DROPS OPHTHALMIC
Qty: 10 | Refills: 0 | Status: ACTIVE | COMMUNITY
Start: 2019-02-05

## 2019-08-26 NOTE — HISTORY OF PRESENT ILLNESS
[de-identified] : 86 year-old female presents for a 3 month breast cancer follow up visit. She is status post left breast lumpectomy and SLNB on 5/15/19.  Final pathology was 1.6 cm invasive ductal carcinoma and DCIS, with 6 benign sentinel lymph nodes and negative margins (T1cN0, ER/AK/HER2 negative).   \par \par She completed adjuvant XRT under the care of Dr. Natividad Lopez on 7/15/19.   No endocrine therapy due to triple negative breast cancer.   No recent blood work. \par \par Reports post radiation skin changes to the left breast. Denies palpable breast masses, nipple discharge or pain. \par \par \par PERTINENT HISTORY:\par Breana states that she has been experiencing occasional Left breast discomfort for approximately 1 year with normal breast imaging last year.  She underwent a mammo/sono on 4/8/19 and was noted with a new 1.7cm spiculated mass in the Left breast 1:00 (Birads 5).  She underwent a US guided biopsy which revealed a Triple Negative invasive poorly differentiated ductal carcinoma measuring at least 11mm.  She then underwent a breast MRI and was noted with nonspecific Left axillary tail lymph nodes for which targeted US evaluation is recommended with potential biopsy (Birads 6).  Today she is without any complaints. Denies palpable breast masses, nipple discharge, skin changes, inversion or breast pain. Denies constitutional symptoms.\par  \par PMH otherwise significant for HTN, DM with neuropathy, glaucoma and irritable bladder.\par Denies family history of breast or ovarian cancer.  Family history of colon cancer in her mother and lung cancer in her father and brother.\par \par Internist:  Dr. Chyna Grady

## 2019-08-26 NOTE — ASSESSMENT
[FreeTextEntry1] : IMP:\par [facility]- S/p left breast lumpectomy for stage 1 triple negative left breast cancer 5/2019\par S/p adjuvant XRT\par No role for endocrine therapy\par \par PLAN:\par BW now\par B/L mammogram/sonogram 2/2020\par RTO q3 months with bloodwork

## 2019-08-26 NOTE — PHYSICAL EXAM
[Normal] : supple, no neck mass and thyroid not enlarged [Normal Supraclavicular Lymph Nodes] : normal supraclavicular lymph nodes [Normal Axillary Lymph Nodes] : normal axillary lymph nodes [Normal] : oriented to person, place and time, with appropriate affect [de-identified] : well healed left breast and axillary incision; post radiation hyperpigmentation;  no palpable masses or nipple discharge bilaterally ;

## 2019-08-26 NOTE — REASON FOR VISIT
[Follow-Up Visit] : a follow-up visit for [Breast Cancer] : breast cancer [Family Member] : family member [FreeTextEntry2] : status post left breast lumpectomy and SLNB on 5/15/19

## 2019-08-28 ENCOUNTER — APPOINTMENT (OUTPATIENT)
Dept: RADIATION ONCOLOGY | Facility: CLINIC | Age: 84
End: 2019-08-28
Payer: COMMERCIAL

## 2019-08-28 VITALS
HEIGHT: 59 IN | HEART RATE: 80 BPM | DIASTOLIC BLOOD PRESSURE: 70 MMHG | OXYGEN SATURATION: 96 % | TEMPERATURE: 98.6 F | RESPIRATION RATE: 16 BRPM | SYSTOLIC BLOOD PRESSURE: 140 MMHG

## 2019-08-28 PROCEDURE — 99024 POSTOP FOLLOW-UP VISIT: CPT | Mod: GC

## 2019-08-30 ENCOUNTER — OUTPATIENT (OUTPATIENT)
Dept: OUTPATIENT SERVICES | Facility: HOSPITAL | Age: 84
LOS: 1 days | End: 2019-08-30
Payer: COMMERCIAL

## 2019-08-30 DIAGNOSIS — Z98.49 CATARACT EXTRACTION STATUS, UNSPECIFIED EYE: Chronic | ICD-10-CM

## 2019-08-30 DIAGNOSIS — Z98.89 OTHER SPECIFIED POSTPROCEDURAL STATES: Chronic | ICD-10-CM

## 2019-08-30 DIAGNOSIS — Z96.659 PRESENCE OF UNSPECIFIED ARTIFICIAL KNEE JOINT: Chronic | ICD-10-CM

## 2019-08-30 DIAGNOSIS — Z96.641 PRESENCE OF RIGHT ARTIFICIAL HIP JOINT: Chronic | ICD-10-CM

## 2019-08-30 DIAGNOSIS — C50.912 MALIGNANT NEOPLASM OF UNSPECIFIED SITE OF LEFT FEMALE BREAST: ICD-10-CM

## 2019-08-30 DIAGNOSIS — Z96.611 PRESENCE OF RIGHT ARTIFICIAL SHOULDER JOINT: Chronic | ICD-10-CM

## 2019-08-30 DIAGNOSIS — Z96.7 PRESENCE OF OTHER BONE AND TENDON IMPLANTS: Chronic | ICD-10-CM

## 2019-08-30 DIAGNOSIS — Z90.89 ACQUIRED ABSENCE OF OTHER ORGANS: Chronic | ICD-10-CM

## 2019-08-30 LAB
ALBUMIN SERPL ELPH-MCNC: 3.4 G/DL — SIGNIFICANT CHANGE UP (ref 3.3–5)
ALP SERPL-CCNC: 68 U/L — SIGNIFICANT CHANGE UP (ref 40–120)
ALT FLD-CCNC: 19 U/L — SIGNIFICANT CHANGE UP (ref 12–78)
AST SERPL-CCNC: 17 U/L — SIGNIFICANT CHANGE UP (ref 15–37)
BILIRUB DIRECT SERPL-MCNC: <.1 MG/DL — SIGNIFICANT CHANGE UP (ref 0.05–0.2)
BILIRUB INDIRECT FLD-MCNC: >0.2 MG/DL — SIGNIFICANT CHANGE UP (ref 0.2–1)
BILIRUB SERPL-MCNC: 0.3 MG/DL — SIGNIFICANT CHANGE UP (ref 0.2–1.2)
PROT SERPL-MCNC: 7.1 G/DL — SIGNIFICANT CHANGE UP (ref 6–8.3)

## 2019-08-30 PROCEDURE — 36415 COLL VENOUS BLD VENIPUNCTURE: CPT

## 2019-08-30 PROCEDURE — 80076 HEPATIC FUNCTION PANEL: CPT

## 2019-09-02 NOTE — HISTORY OF PRESENT ILLNESS
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 She was treated to a dose of 5,240 cGy to the left breast from 6/17/2019 - 7/15/2019. She tolerated treatment well.\par \par She followed up with Dr. Mcneill on 8/26/19 and was HENRY. She is planned for breast imaging 2/2020. She denies breast pain/mass, drainage, or fatigue. No other complaints.\par \par \par \par

## 2019-09-02 NOTE — DISEASE MANAGEMENT
[Pathological] : TNM Stage: p [I] : I [TTNM] : 1c [NTNM] : 0 [MTNM] : x [de-identified] : left breast None

## 2019-09-02 NOTE — REVIEW OF SYSTEMS
[Negative] : Heme/Lymph [Fatigue: Grade 0] : Fatigue: Grade 0 [Dermatitis Radiation: Grade 0] : Dermatitis Radiation: Grade 0 [Skin Hyperpigmentation: Grade 1 - Hyperpigmentation covering <10% BSA; no psychosocial impact] : Skin Hyperpigmentation: Grade 1 - Hyperpigmentation covering <10% BSA; no psychosocial impact [Skin Induration: Grade 0] : Skin Induration: Grade 0

## 2019-09-03 LAB
CANCER AG27-29 SERPL-ACNC: 18.6 U/ML
CEA SERPL-MCNC: 3.4 NG/ML

## 2019-11-13 ENCOUNTER — APPOINTMENT (OUTPATIENT)
Dept: SURGICAL ONCOLOGY | Facility: CLINIC | Age: 84
End: 2019-11-13
Payer: MEDICARE

## 2019-11-13 VITALS
HEART RATE: 93 BPM | OXYGEN SATURATION: 93 % | WEIGHT: 215 LBS | RESPIRATION RATE: 16 BRPM | HEIGHT: 57 IN | BODY MASS INDEX: 46.38 KG/M2 | DIASTOLIC BLOOD PRESSURE: 94 MMHG | SYSTOLIC BLOOD PRESSURE: 190 MMHG

## 2019-11-13 PROCEDURE — 99214 OFFICE O/P EST MOD 30 MIN: CPT

## 2019-11-13 NOTE — PHYSICAL EXAM
[Normal] : supple, no neck mass and thyroid not enlarged [Normal Supraclavicular Lymph Nodes] : normal supraclavicular lymph nodes [Normal Axillary Lymph Nodes] : normal axillary lymph nodes [Normal] : oriented to person, place and time, with appropriate affect [de-identified] : Well healed left breast and axillary incision; post radiation hyperpigmentation improving; No palpable masses or nipple discharge bilaterally ;.  [FreeTextEntry1] : Deferred

## 2019-11-13 NOTE — ASSESSMENT
[FreeTextEntry1] : IMP:\par HENRY- S/p left breast lumpectomy for stage 1 triple negative left breast cancer 5/2019\par S/p adjuvant XRT\par No role for endocrine therapy\par \par Plan:\par RTO q3 months \par BW by med/onc ( Dr. Denise )\par B/L mammogram/sonogram 1/2020\par

## 2019-11-13 NOTE — HISTORY OF PRESENT ILLNESS
[de-identified] : 86 year-old female presents for a 3 month breast cancer follow up visit. She is status post left breast lumpectomy and SLNB on 5/15/19.  Final pathology was 1.6 cm invasive ductal carcinoma and DCIS, with 6 benign sentinel lymph nodes and negative margins (T1cN0, ER/ND/HER2 negative).   \par \par She completed adjuvant XRT under the care of Dr. Natividad Lopez on 7/15/19.   No endocrine therapy due to triple negative breast cancer.  Follows with med/onc in [place].\par \par BW Aug 2019:\par CEA: 3.4 ng/mL\par CA 27.29 = 18.6 U/mL\par LFTs: WNL\par \par Denies palpable breast masses, nipple discharge or pain. \par \par \par PERTINENT HISTORY:\par Breana states that she has been experiencing occasional Left breast discomfort for approximately 1 year with normal breast imaging last year.  She underwent a mammo/sono on 4/8/19 and was noted with a new 1.7cm spiculated mass in the Left breast 1:00 (Birads 5).  She underwent a US guided biopsy which revealed a Triple Negative invasive poorly differentiated ductal carcinoma measuring at least 11mm.  She then underwent a breast MRI and was noted with nonspecific Left axillary tail lymph nodes for which targeted US evaluation is recommended with potential biopsy (Birads 6).  Today she is without any complaints. Denies palpable breast masses, nipple discharge, skin changes, inversion or breast pain. Denies constitutional symptoms.\par  \par PMH otherwise significant for HTN, DM with neuropathy, glaucoma and irritable bladder.\par Denies family history of breast or ovarian cancer.  Family history of colon cancer in her mother and lung cancer in her father and brother.\par \par Internist:  Dr. Chyna Grady

## 2019-12-11 NOTE — DISEASE MANAGEMENT
Give Azithromycin as prescribed     To help your child feel better:  Zyrtec  and benadryl for rash. Hydrocortisone as needed    Your child needs plenty of rest.  Drink plenty of fluids.   Can give honey 5 mL as needed for cough to kids above one year of age.  Give ibuprofen or Tylenol to help your child's pain or the discomfort caused by fever.   Cool-mist humidifier in your child's room can help your child breathe more easily.  Nasal saline spray for nasal congestion.  Everyone at home should wash their hands with soap and warm water often to stop the spread of germs.    Call or see your child's doctor if your child:  Is breathing faster, has color changes like turning blue or pale, retractions, you can see ribs or has wheezing or has pain with breathing.  Watch for lip or face swelling , wheezing , abd pain or rash worsen to return to ER   Has a temperature of 102  F or higher that lasts more than 2 days.  Has signs of being very sick such as sleeping all the time and being less active.  A cough lasts more than 14-21 days or is getting worse after 7-10 days.   A runny nose lasts more than 14 days.  Yellow discharge from the eyes starts and lasts more than a day.   If symptoms worsen or new ones arise to call clinic.     [Pathological] : TNM Stage: p [I] : I [TTNM] : 1c [NTNM] : 0 [MTNM] : x [de-identified] : 0 fx [de-identified] : 20 fx [de-identified] : left breast

## 2020-02-17 ENCOUNTER — APPOINTMENT (OUTPATIENT)
Dept: SURGICAL ONCOLOGY | Facility: CLINIC | Age: 85
End: 2020-02-17
Payer: MEDICARE

## 2020-02-17 VITALS
HEIGHT: 59 IN | SYSTOLIC BLOOD PRESSURE: 199 MMHG | DIASTOLIC BLOOD PRESSURE: 100 MMHG | OXYGEN SATURATION: 92 % | HEART RATE: 85 BPM | RESPIRATION RATE: 16 BRPM | WEIGHT: 215 LBS | BODY MASS INDEX: 43.34 KG/M2

## 2020-02-17 DIAGNOSIS — C50.912 MALIGNANT NEOPLASM OF UNSPECIFIED SITE OF LEFT FEMALE BREAST: ICD-10-CM

## 2020-02-17 PROCEDURE — 99214 OFFICE O/P EST MOD 30 MIN: CPT

## 2020-02-17 RX ORDER — TIZANIDINE 4 MG/1
4 TABLET ORAL
Qty: 30 | Refills: 0 | Status: ACTIVE | COMMUNITY
Start: 2019-11-11

## 2020-02-17 NOTE — HISTORY OF PRESENT ILLNESS
[de-identified] : 87 year-old female presents for a 3 month breast cancer follow up visit. She is status post left breast lumpectomy and SLNB on 5/15/19.  Final pathology was 1.6 cm invasive ductal carcinoma and DCIS, with 6 benign sentinel lymph nodes and negative margins (T1cN0, ER/CA/HER2 negative).   \par \par She completed adjuvant XRT under the care of Dr. Natividad Lopez on 7/15/19.   No endocrine therapy due to triple negative breast cancer.  Follows with med/onc in [place].\par \par BW Aug 2019:\par CEA: 3.4 ng/mL\par CA 27.29 = 18.6 U/mL\par LFTs: WNL\par \par Left mammogram/sonogram 1/2020- No evidence of malignancy. Post surgical and post radiation changes in the upper outer left breast.  BIRADS 2 \par \par Denies palpable breast masses, nipple discharge or pain. \par \par \par PERTINENT HISTORY:\par Breana states that she has been experiencing occasional Left breast discomfort for approximately 1 year with normal breast imaging last year.  She underwent a mammo/sono on 4/8/19 and was noted with a new 1.7cm spiculated mass in the Left breast 1:00 (Birads 5).  She underwent a US guided biopsy which revealed a Triple Negative invasive poorly differentiated ductal carcinoma measuring at least 11mm.  She then underwent a breast MRI and was noted with nonspecific Left axillary tail lymph nodes for which targeted US evaluation is recommended with potential biopsy (Birads 6).  Today she is without any complaints. Denies palpable breast masses, nipple discharge, skin changes, inversion or breast pain. Denies constitutional symptoms.\par  \par PMH otherwise significant for HTN, DM with neuropathy, glaucoma and irritable bladder.\par Denies family history of breast or ovarian cancer.  Family history of colon cancer in her mother and lung cancer in her father and brother.\par \par Internist:  Dr. Chyna Grady

## 2020-02-17 NOTE — PHYSICAL EXAM
[Normal] : supple, no neck mass and thyroid not enlarged [Normal Axillary Lymph Nodes] : normal axillary lymph nodes [Normal Supraclavicular Lymph Nodes] : normal supraclavicular lymph nodes [Normal] : oriented to person, place and time, with appropriate affect [de-identified] : Well healed left breast and axillary incision; post radiation hyperpigmentation improving; No palpable masses or nipple discharge bilaterally ;.  [FreeTextEntry1] : Deferred

## 2020-02-17 NOTE — ASSESSMENT
[FreeTextEntry1] : IMP:\par HENRY- S/p left breast lumpectomy for stage 1 triple negative left breast cancer 5/2019\par S/p adjuvant XRT\par No role for endocrine therapy\par \par Plan:\par RTO 3 months then Q4M\par BW by med/onc ( Dr. Denise )\par B/L mammogram/sonogram 6/2020\par

## 2020-03-03 ENCOUNTER — APPOINTMENT (OUTPATIENT)
Dept: RADIATION ONCOLOGY | Facility: CLINIC | Age: 85
End: 2020-03-03

## 2020-03-03 NOTE — PHYSICAL EXAM
[Sclera] : the sclera and conjunctiva were normal [Both Tympanic Membranes Were Examined] : both tympanic membranes were normal [Breast Palpation Mass] : no palpable masses [Breast Abnormal Lactation (Galactorrhea)] : no nipple discharge [Normal] : normal external genitalia [Abdomen Soft] : soft [Nondistended] : nondistended [Abdomen Tenderness] : non-tender [Normal] : oriented to person, place and time, the affect was normal, the mood was normal and not anxious [de-identified] : left breast hyperpigmentation, mild edema; stable seroma in area of surgical cavity

## 2020-03-03 NOTE — HISTORY OF PRESENT ILLNESS
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 She was treated to a dose of 5,240 cGy to the left breast from 6/17/2019 - 7/15/2019. She tolerated treatment well.\par \par At her last visit on 8/28/19 she was doing well.\par \par She saw Dr. Mcneill on 2/17/20 and will RTO in 3 months.\par \par She presents for a follow up.\par Today she notes\par \par \par Dr. Mcneill on 8/26/19 \par \par \par \par

## 2020-03-03 NOTE — REVIEW OF SYSTEMS
[Negative] : Endocrine [Fatigue: Grade 0] : Fatigue: Grade 0 [Skin Hyperpigmentation: Grade 1 - Hyperpigmentation covering <10% BSA; no psychosocial impact] : Skin Hyperpigmentation: Grade 1 - Hyperpigmentation covering <10% BSA; no psychosocial impact [Skin Induration: Grade 0] : Skin Induration: Grade 0 [Dermatitis Radiation: Grade 0] : Dermatitis Radiation: Grade 0

## 2020-03-03 NOTE — REASON FOR VISIT
[Post-Treatment Evaluation] : post-treatment evaluation for [Breast Cancer] : breast cancer [Routine Follow-Up] : routine follow-up visit for [Other: _____] : [unfilled]

## 2020-03-03 NOTE — REVIEW OF SYSTEMS
[Negative] : Heme/Lymph [Fatigue: Grade 0] : Fatigue: Grade 0 [Skin Hyperpigmentation: Grade 1 - Hyperpigmentation covering <10% BSA; no psychosocial impact] : Skin Hyperpigmentation: Grade 1 - Hyperpigmentation covering <10% BSA; no psychosocial impact [Skin Induration: Grade 0] : Skin Induration: Grade 0 [Dermatitis Radiation: Grade 0] : Dermatitis Radiation: Grade 0

## 2020-03-03 NOTE — PHYSICAL EXAM
[Sclera] : the sclera and conjunctiva were normal [Both Tympanic Membranes Were Examined] : both tympanic membranes were normal [Breast Palpation Mass] : no palpable masses [Breast Abnormal Lactation (Galactorrhea)] : no nipple discharge [Nondistended] : nondistended [Normal] : normal external genitalia [Abdomen Tenderness] : non-tender [Abdomen Soft] : soft [Normal] : oriented to person, place and time, the affect was normal, the mood was normal and not anxious [de-identified] : left breast hyperpigmentation, mild edema; stable seroma in area of surgical cavity

## 2020-03-25 ENCOUNTER — INPATIENT (INPATIENT)
Facility: HOSPITAL | Age: 85
LOS: 0 days | DRG: 208 | End: 2020-03-26
Attending: INTERNAL MEDICINE | Admitting: INTERNAL MEDICINE
Payer: COMMERCIAL

## 2020-03-25 VITALS
WEIGHT: 250 LBS | HEIGHT: 61 IN | OXYGEN SATURATION: 86 % | TEMPERATURE: 98 F | RESPIRATION RATE: 18 BRPM | HEART RATE: 121 BPM | SYSTOLIC BLOOD PRESSURE: 120 MMHG | DIASTOLIC BLOOD PRESSURE: 62 MMHG

## 2020-03-25 DIAGNOSIS — Z98.89 OTHER SPECIFIED POSTPROCEDURAL STATES: Chronic | ICD-10-CM

## 2020-03-25 DIAGNOSIS — N17.9 ACUTE KIDNEY FAILURE, UNSPECIFIED: ICD-10-CM

## 2020-03-25 DIAGNOSIS — Z98.42 CATARACT EXTRACTION STATUS, LEFT EYE: ICD-10-CM

## 2020-03-25 DIAGNOSIS — Z96.653 PRESENCE OF ARTIFICIAL KNEE JOINT, BILATERAL: ICD-10-CM

## 2020-03-25 DIAGNOSIS — Z85.3 PERSONAL HISTORY OF MALIGNANT NEOPLASM OF BREAST: ICD-10-CM

## 2020-03-25 DIAGNOSIS — R01.1 CARDIAC MURMUR, UNSPECIFIED: ICD-10-CM

## 2020-03-25 DIAGNOSIS — N28.9 DISORDER OF KIDNEY AND URETER, UNSPECIFIED: ICD-10-CM

## 2020-03-25 DIAGNOSIS — Z96.641 PRESENCE OF RIGHT ARTIFICIAL HIP JOINT: ICD-10-CM

## 2020-03-25 DIAGNOSIS — E66.01 MORBID (SEVERE) OBESITY DUE TO EXCESS CALORIES: ICD-10-CM

## 2020-03-25 DIAGNOSIS — E11.40 TYPE 2 DIABETES MELLITUS WITH DIABETIC NEUROPATHY, UNSPECIFIED: ICD-10-CM

## 2020-03-25 DIAGNOSIS — R00.0 TACHYCARDIA, UNSPECIFIED: ICD-10-CM

## 2020-03-25 DIAGNOSIS — E11.21 TYPE 2 DIABETES MELLITUS WITH DIABETIC NEPHROPATHY: ICD-10-CM

## 2020-03-25 DIAGNOSIS — K72.00 ACUTE AND SUBACUTE HEPATIC FAILURE WITHOUT COMA: ICD-10-CM

## 2020-03-25 DIAGNOSIS — Z96.7 PRESENCE OF OTHER BONE AND TENDON IMPLANTS: Chronic | ICD-10-CM

## 2020-03-25 DIAGNOSIS — Z96.611 PRESENCE OF RIGHT ARTIFICIAL SHOULDER JOINT: ICD-10-CM

## 2020-03-25 DIAGNOSIS — Z66 DO NOT RESUSCITATE: ICD-10-CM

## 2020-03-25 DIAGNOSIS — R06.82 TACHYPNEA, NOT ELSEWHERE CLASSIFIED: ICD-10-CM

## 2020-03-25 DIAGNOSIS — I24.8 OTHER FORMS OF ACUTE ISCHEMIC HEART DISEASE: ICD-10-CM

## 2020-03-25 DIAGNOSIS — Z90.89 ACQUIRED ABSENCE OF OTHER ORGANS: Chronic | ICD-10-CM

## 2020-03-25 DIAGNOSIS — Z96.659 PRESENCE OF UNSPECIFIED ARTIFICIAL KNEE JOINT: Chronic | ICD-10-CM

## 2020-03-25 DIAGNOSIS — B97.29 OTHER CORONAVIRUS AS THE CAUSE OF DISEASES CLASSIFIED ELSEWHERE: ICD-10-CM

## 2020-03-25 DIAGNOSIS — M19.90 UNSPECIFIED OSTEOARTHRITIS, UNSPECIFIED SITE: ICD-10-CM

## 2020-03-25 DIAGNOSIS — H40.9 UNSPECIFIED GLAUCOMA: ICD-10-CM

## 2020-03-25 DIAGNOSIS — Z96.641 PRESENCE OF RIGHT ARTIFICIAL HIP JOINT: Chronic | ICD-10-CM

## 2020-03-25 DIAGNOSIS — Z96.611 PRESENCE OF RIGHT ARTIFICIAL SHOULDER JOINT: Chronic | ICD-10-CM

## 2020-03-25 DIAGNOSIS — Z98.49 CATARACT EXTRACTION STATUS, UNSPECIFIED EYE: Chronic | ICD-10-CM

## 2020-03-25 DIAGNOSIS — Z98.41 CATARACT EXTRACTION STATUS, RIGHT EYE: ICD-10-CM

## 2020-03-25 DIAGNOSIS — Z41.8 ENCOUNTER FOR OTHER PROCEDURES FOR PURPOSES OTHER THAN REMEDYING HEALTH STATE: ICD-10-CM

## 2020-03-25 DIAGNOSIS — J18.9 PNEUMONIA, UNSPECIFIED ORGANISM: ICD-10-CM

## 2020-03-25 DIAGNOSIS — I10 ESSENTIAL (PRIMARY) HYPERTENSION: ICD-10-CM

## 2020-03-25 DIAGNOSIS — J12.89 OTHER VIRAL PNEUMONIA: ICD-10-CM

## 2020-03-25 DIAGNOSIS — E78.5 HYPERLIPIDEMIA, UNSPECIFIED: ICD-10-CM

## 2020-03-25 LAB
ALBUMIN SERPL ELPH-MCNC: 2.7 G/DL — LOW (ref 3.3–5)
ALP SERPL-CCNC: 52 U/L — SIGNIFICANT CHANGE UP (ref 40–120)
ALT FLD-CCNC: 32 U/L — SIGNIFICANT CHANGE UP (ref 12–78)
ANION GAP SERPL CALC-SCNC: 10 MMOL/L — SIGNIFICANT CHANGE UP (ref 5–17)
APPEARANCE UR: CLEAR — SIGNIFICANT CHANGE UP
AST SERPL-CCNC: 58 U/L — HIGH (ref 15–37)
BASE EXCESS BLDA CALC-SCNC: -1.4 MMOL/L — SIGNIFICANT CHANGE UP (ref -2–2)
BASOPHILS # BLD AUTO: 0.01 K/UL — SIGNIFICANT CHANGE UP (ref 0–0.2)
BASOPHILS NFR BLD AUTO: 0.2 % — SIGNIFICANT CHANGE UP (ref 0–2)
BILIRUB SERPL-MCNC: 0.3 MG/DL — SIGNIFICANT CHANGE UP (ref 0.2–1.2)
BILIRUB UR-MCNC: NEGATIVE — SIGNIFICANT CHANGE UP
BLOOD GAS COMMENTS ARTERIAL: SIGNIFICANT CHANGE UP
BUN SERPL-MCNC: 45 MG/DL — HIGH (ref 7–23)
CALCIUM SERPL-MCNC: 9.3 MG/DL — SIGNIFICANT CHANGE UP (ref 8.5–10.1)
CHLORIDE SERPL-SCNC: 113 MMOL/L — HIGH (ref 96–108)
CK MB BLD-MCNC: 1.8 % — SIGNIFICANT CHANGE UP (ref 0–3.5)
CK MB CFR SERPL CALC: 3.3 NG/ML — SIGNIFICANT CHANGE UP (ref 0–3.6)
CK SERPL-CCNC: 181 U/L — SIGNIFICANT CHANGE UP (ref 26–192)
CO2 SERPL-SCNC: 24 MMOL/L — SIGNIFICANT CHANGE UP (ref 22–31)
COLOR SPEC: YELLOW — SIGNIFICANT CHANGE UP
CREAT SERPL-MCNC: 1.1 MG/DL — SIGNIFICANT CHANGE UP (ref 0.5–1.3)
DIFF PNL FLD: ABNORMAL
EOSINOPHIL # BLD AUTO: 0 K/UL — SIGNIFICANT CHANGE UP (ref 0–0.5)
EOSINOPHIL NFR BLD AUTO: 0 % — SIGNIFICANT CHANGE UP (ref 0–6)
GLUCOSE SERPL-MCNC: 188 MG/DL — HIGH (ref 70–99)
GLUCOSE UR QL: NEGATIVE — SIGNIFICANT CHANGE UP
HCO3 BLDA-SCNC: 23 MMOL/L — SIGNIFICANT CHANGE UP (ref 23–27)
HCT VFR BLD CALC: 42 % — SIGNIFICANT CHANGE UP (ref 34.5–45)
HGB BLD-MCNC: 13 G/DL — SIGNIFICANT CHANGE UP (ref 11.5–15.5)
HOROWITZ INDEX BLDA+IHG-RTO: 2 — SIGNIFICANT CHANGE UP
IMM GRANULOCYTES NFR BLD AUTO: 1.2 % — SIGNIFICANT CHANGE UP (ref 0–1.5)
KETONES UR-MCNC: NEGATIVE — SIGNIFICANT CHANGE UP
LACTATE SERPL-SCNC: 1.6 MMOL/L — SIGNIFICANT CHANGE UP (ref 0.7–2)
LEUKOCYTE ESTERASE UR-ACNC: NEGATIVE — SIGNIFICANT CHANGE UP
LYMPHOCYTES # BLD AUTO: 0.61 K/UL — LOW (ref 1–3.3)
LYMPHOCYTES # BLD AUTO: 10.8 % — LOW (ref 13–44)
MCHC RBC-ENTMCNC: 27.6 PG — SIGNIFICANT CHANGE UP (ref 27–34)
MCHC RBC-ENTMCNC: 31 GM/DL — LOW (ref 32–36)
MCV RBC AUTO: 89.2 FL — SIGNIFICANT CHANGE UP (ref 80–100)
MONOCYTES # BLD AUTO: 0.48 K/UL — SIGNIFICANT CHANGE UP (ref 0–0.9)
MONOCYTES NFR BLD AUTO: 8.5 % — SIGNIFICANT CHANGE UP (ref 2–14)
NEUTROPHILS # BLD AUTO: 4.5 K/UL — SIGNIFICANT CHANGE UP (ref 1.8–7.4)
NEUTROPHILS NFR BLD AUTO: 79.3 % — HIGH (ref 43–77)
NITRITE UR-MCNC: NEGATIVE — SIGNIFICANT CHANGE UP
NRBC # BLD: 0 /100 WBCS — SIGNIFICANT CHANGE UP (ref 0–0)
NT-PROBNP SERPL-SCNC: 4353 PG/ML — HIGH (ref 0–450)
PCO2 BLDA: 33 MMHG — SIGNIFICANT CHANGE UP (ref 32–46)
PH BLDA: 7.44 — SIGNIFICANT CHANGE UP (ref 7.35–7.45)
PH UR: 5 — SIGNIFICANT CHANGE UP (ref 5–8)
PLATELET # BLD AUTO: 197 K/UL — SIGNIFICANT CHANGE UP (ref 150–400)
PO2 BLDA: <44 MMHG — CRITICAL LOW (ref 74–108)
POTASSIUM SERPL-MCNC: 4.5 MMOL/L — SIGNIFICANT CHANGE UP (ref 3.5–5.3)
POTASSIUM SERPL-SCNC: 4.5 MMOL/L — SIGNIFICANT CHANGE UP (ref 3.5–5.3)
PROT SERPL-MCNC: 7.2 G/DL — SIGNIFICANT CHANGE UP (ref 6–8.3)
PROT UR-MCNC: 100
RAPID RVP RESULT: SIGNIFICANT CHANGE UP
RBC # BLD: 4.71 M/UL — SIGNIFICANT CHANGE UP (ref 3.8–5.2)
RBC # FLD: 14.5 % — SIGNIFICANT CHANGE UP (ref 10.3–14.5)
SAO2 % BLDA: 72 % — LOW (ref 92–96)
SODIUM SERPL-SCNC: 147 MMOL/L — HIGH (ref 135–145)
SP GR SPEC: 1.01 — SIGNIFICANT CHANGE UP (ref 1.01–1.02)
TROPONIN I SERPL-MCNC: 0.09 NG/ML — HIGH (ref 0.01–0.04)
TROPONIN I SERPL-MCNC: 0.56 NG/ML — HIGH (ref 0.01–0.04)
UROBILINOGEN FLD QL: NEGATIVE — SIGNIFICANT CHANGE UP
WBC # BLD: 5.67 K/UL — SIGNIFICANT CHANGE UP (ref 3.8–10.5)
WBC # FLD AUTO: 5.67 K/UL — SIGNIFICANT CHANGE UP (ref 3.8–10.5)

## 2020-03-25 PROCEDURE — 99285 EMERGENCY DEPT VISIT HI MDM: CPT

## 2020-03-25 PROCEDURE — 71275 CT ANGIOGRAPHY CHEST: CPT | Mod: 26

## 2020-03-25 PROCEDURE — 99223 1ST HOSP IP/OBS HIGH 75: CPT | Mod: GC

## 2020-03-25 PROCEDURE — 71045 X-RAY EXAM CHEST 1 VIEW: CPT | Mod: 26

## 2020-03-25 PROCEDURE — 93010 ELECTROCARDIOGRAM REPORT: CPT

## 2020-03-25 PROCEDURE — 93010 ELECTROCARDIOGRAM REPORT: CPT | Mod: 77

## 2020-03-25 RX ORDER — QUINAPRIL HYDROCHLORIDE 40 MG/1
1 TABLET, FILM COATED ORAL
Qty: 0 | Refills: 0 | DISCHARGE

## 2020-03-25 RX ORDER — DEXTROSE 50 % IN WATER 50 %
25 SYRINGE (ML) INTRAVENOUS ONCE
Refills: 0 | Status: DISCONTINUED | OUTPATIENT
Start: 2020-03-25 | End: 2020-03-26

## 2020-03-25 RX ORDER — AZITHROMYCIN 500 MG/1
500 TABLET, FILM COATED ORAL EVERY 24 HOURS
Refills: 0 | Status: DISCONTINUED | OUTPATIENT
Start: 2020-03-26 | End: 2020-03-26

## 2020-03-25 RX ORDER — PIPERACILLIN AND TAZOBACTAM 4; .5 G/20ML; G/20ML
3.38 INJECTION, POWDER, LYOPHILIZED, FOR SOLUTION INTRAVENOUS ONCE
Refills: 0 | Status: COMPLETED | OUTPATIENT
Start: 2020-03-25 | End: 2020-03-25

## 2020-03-25 RX ORDER — SODIUM CHLORIDE 9 MG/ML
1000 INJECTION INTRAMUSCULAR; INTRAVENOUS; SUBCUTANEOUS ONCE
Refills: 0 | Status: COMPLETED | OUTPATIENT
Start: 2020-03-25 | End: 2020-03-25

## 2020-03-25 RX ORDER — DEXTROSE 50 % IN WATER 50 %
12.5 SYRINGE (ML) INTRAVENOUS ONCE
Refills: 0 | Status: DISCONTINUED | OUTPATIENT
Start: 2020-03-25 | End: 2020-03-26

## 2020-03-25 RX ORDER — METOPROLOL TARTRATE 50 MG
25 TABLET ORAL DAILY
Refills: 0 | Status: DISCONTINUED | OUTPATIENT
Start: 2020-03-25 | End: 2020-03-26

## 2020-03-25 RX ORDER — CEFTRIAXONE 500 MG/1
INJECTION, POWDER, FOR SOLUTION INTRAMUSCULAR; INTRAVENOUS
Refills: 0 | Status: DISCONTINUED | OUTPATIENT
Start: 2020-03-25 | End: 2020-03-26

## 2020-03-25 RX ORDER — ACETAMINOPHEN 500 MG
650 TABLET ORAL ONCE
Refills: 0 | Status: COMPLETED | OUTPATIENT
Start: 2020-03-25 | End: 2020-03-25

## 2020-03-25 RX ORDER — DILTIAZEM HCL 120 MG
10 CAPSULE, EXT RELEASE 24 HR ORAL ONCE
Refills: 0 | Status: COMPLETED | OUTPATIENT
Start: 2020-03-25 | End: 2020-03-25

## 2020-03-25 RX ORDER — TOLTERODINE TARTRATE 1 MG/1
1 TABLET, FILM COATED ORAL
Qty: 0 | Refills: 0 | DISCHARGE

## 2020-03-25 RX ORDER — OMEGA-3 ACID ETHYL ESTERS 1 G
1 CAPSULE ORAL
Qty: 0 | Refills: 0 | DISCHARGE

## 2020-03-25 RX ORDER — CEFTRIAXONE 500 MG/1
1000 INJECTION, POWDER, FOR SOLUTION INTRAMUSCULAR; INTRAVENOUS EVERY 24 HOURS
Refills: 0 | Status: DISCONTINUED | OUTPATIENT
Start: 2020-03-26 | End: 2020-03-26

## 2020-03-25 RX ORDER — SODIUM CHLORIDE 9 MG/ML
1000 INJECTION, SOLUTION INTRAVENOUS
Refills: 0 | Status: DISCONTINUED | OUTPATIENT
Start: 2020-03-25 | End: 2020-03-26

## 2020-03-25 RX ORDER — HEPARIN SODIUM 5000 [USP'U]/ML
5000 INJECTION INTRAVENOUS; SUBCUTANEOUS EVERY 8 HOURS
Refills: 0 | Status: DISCONTINUED | OUTPATIENT
Start: 2020-03-25 | End: 2020-03-26

## 2020-03-25 RX ORDER — SIMVASTATIN 20 MG/1
10 TABLET, FILM COATED ORAL AT BEDTIME
Refills: 0 | Status: DISCONTINUED | OUTPATIENT
Start: 2020-03-25 | End: 2020-03-26

## 2020-03-25 RX ORDER — MIRABEGRON 50 MG/1
1 TABLET, EXTENDED RELEASE ORAL
Qty: 0 | Refills: 0 | DISCHARGE

## 2020-03-25 RX ORDER — DORZOLAMIDE HYDROCHLORIDE 20 MG/ML
1 SOLUTION/ DROPS OPHTHALMIC
Qty: 0 | Refills: 0 | DISCHARGE

## 2020-03-25 RX ORDER — ACETAMINOPHEN 500 MG
650 TABLET ORAL EVERY 6 HOURS
Refills: 0 | Status: DISCONTINUED | OUTPATIENT
Start: 2020-03-25 | End: 2020-03-26

## 2020-03-25 RX ORDER — AZITHROMYCIN 500 MG/1
500 TABLET, FILM COATED ORAL ONCE
Refills: 0 | Status: COMPLETED | OUTPATIENT
Start: 2020-03-25 | End: 2020-03-25

## 2020-03-25 RX ORDER — DEXTROSE 50 % IN WATER 50 %
15 SYRINGE (ML) INTRAVENOUS ONCE
Refills: 0 | Status: DISCONTINUED | OUTPATIENT
Start: 2020-03-25 | End: 2020-03-26

## 2020-03-25 RX ORDER — GABAPENTIN 400 MG/1
300 CAPSULE ORAL THREE TIMES A DAY
Refills: 0 | Status: DISCONTINUED | OUTPATIENT
Start: 2020-03-25 | End: 2020-03-26

## 2020-03-25 RX ORDER — METOPROLOL TARTRATE 50 MG
5 TABLET ORAL ONCE
Refills: 0 | Status: COMPLETED | OUTPATIENT
Start: 2020-03-25 | End: 2020-03-25

## 2020-03-25 RX ORDER — AZITHROMYCIN 500 MG/1
TABLET, FILM COATED ORAL
Refills: 0 | Status: DISCONTINUED | OUTPATIENT
Start: 2020-03-25 | End: 2020-03-26

## 2020-03-25 RX ORDER — TAFLUPROST 0 MG/.3ML
1 SOLUTION/ DROPS OPHTHALMIC
Qty: 0 | Refills: 0 | DISCHARGE

## 2020-03-25 RX ORDER — GLUCAGON INJECTION, SOLUTION 0.5 MG/.1ML
1 INJECTION, SOLUTION SUBCUTANEOUS ONCE
Refills: 0 | Status: DISCONTINUED | OUTPATIENT
Start: 2020-03-25 | End: 2020-03-26

## 2020-03-25 RX ORDER — INSULIN LISPRO 100/ML
VIAL (ML) SUBCUTANEOUS
Refills: 0 | Status: DISCONTINUED | OUTPATIENT
Start: 2020-03-25 | End: 2020-03-26

## 2020-03-25 RX ORDER — ONDANSETRON 8 MG/1
4 TABLET, FILM COATED ORAL EVERY 6 HOURS
Refills: 0 | Status: DISCONTINUED | OUTPATIENT
Start: 2020-03-25 | End: 2020-03-26

## 2020-03-25 RX ORDER — CEFTRIAXONE 500 MG/1
1000 INJECTION, POWDER, FOR SOLUTION INTRAMUSCULAR; INTRAVENOUS ONCE
Refills: 0 | Status: COMPLETED | OUTPATIENT
Start: 2020-03-25 | End: 2020-03-25

## 2020-03-25 RX ORDER — OXYBUTYNIN CHLORIDE 5 MG
5 TABLET ORAL
Refills: 0 | Status: DISCONTINUED | OUTPATIENT
Start: 2020-03-25 | End: 2020-03-26

## 2020-03-25 RX ORDER — ASPIRIN/CALCIUM CARB/MAGNESIUM 324 MG
81 TABLET ORAL DAILY
Refills: 0 | Status: DISCONTINUED | OUTPATIENT
Start: 2020-03-25 | End: 2020-03-26

## 2020-03-25 RX ORDER — VANCOMYCIN HCL 1 G
1000 VIAL (EA) INTRAVENOUS ONCE
Refills: 0 | Status: COMPLETED | OUTPATIENT
Start: 2020-03-25 | End: 2020-03-25

## 2020-03-25 RX ADMIN — Medication 1: at 19:30

## 2020-03-25 RX ADMIN — CEFTRIAXONE 100 MILLIGRAM(S): 500 INJECTION, POWDER, FOR SOLUTION INTRAMUSCULAR; INTRAVENOUS at 17:53

## 2020-03-25 RX ADMIN — Medication 650 MILLIGRAM(S): at 13:10

## 2020-03-25 RX ADMIN — GABAPENTIN 300 MILLIGRAM(S): 400 CAPSULE ORAL at 23:18

## 2020-03-25 RX ADMIN — Medication 250 MILLIGRAM(S): at 14:49

## 2020-03-25 RX ADMIN — HEPARIN SODIUM 5000 UNIT(S): 5000 INJECTION INTRAVENOUS; SUBCUTANEOUS at 23:18

## 2020-03-25 RX ADMIN — Medication 10 MILLIGRAM(S): at 20:42

## 2020-03-25 RX ADMIN — Medication 650 MILLIGRAM(S): at 14:48

## 2020-03-25 RX ADMIN — SIMVASTATIN 10 MILLIGRAM(S): 20 TABLET, FILM COATED ORAL at 23:47

## 2020-03-25 RX ADMIN — PIPERACILLIN AND TAZOBACTAM 200 GRAM(S): 4; .5 INJECTION, POWDER, LYOPHILIZED, FOR SOLUTION INTRAVENOUS at 14:12

## 2020-03-25 RX ADMIN — Medication 10 MILLIGRAM(S): at 14:12

## 2020-03-25 RX ADMIN — Medication 5 MILLIGRAM(S): at 19:30

## 2020-03-25 RX ADMIN — SODIUM CHLORIDE 1000 MILLILITER(S): 9 INJECTION INTRAMUSCULAR; INTRAVENOUS; SUBCUTANEOUS at 13:50

## 2020-03-25 RX ADMIN — AZITHROMYCIN 255 MILLIGRAM(S): 500 TABLET, FILM COATED ORAL at 19:00

## 2020-03-25 RX ADMIN — SODIUM CHLORIDE 500 MILLILITER(S): 9 INJECTION INTRAMUSCULAR; INTRAVENOUS; SUBCUTANEOUS at 12:10

## 2020-03-25 NOTE — H&P ADULT - ASSESSMENT
86 yo F presenting with fever and SOB found to have PNA and possible COVID infection. PMH breast cancer s/p radiation, DM2, HTN, HLD.     PNA: will start on Rocephin and azithromycin.   -also concern for underlying COVID infection.   -will need isolation precautions as ordered.   -patient tolerated tylenol in past  -avoid aerosol generating meds such as nebs and BIPAP/CPAP.   -consult pulmonology.   -check CRP, ESR, procalcitonin    Tachycardia: sinus tachycardia on EKG and sinus rhythnm on telemetry.   -monitor for A fib.   -cardiology consult.     Elevated Troponin: likely due to demand ischemia in setting of tachycardia and PNA.   -continue to trend.   -[facility] group consulted.   -if significantly elevated can consider heparin gtt    DM2: SSI, diabetic diet    HTN: BP stable and at goal.     HLD: continue statin.     DVT ppx: heparin

## 2020-03-25 NOTE — ED PROVIDER NOTE - ATTENDING CONTRIBUTION TO CARE
86 yo black female with few weeks of intermittent cough and SOB and low grade fever. Exam revealed elderly black female in NAD with fine dry rales at bases. I agree with plan and management outlined by PA.

## 2020-03-25 NOTE — ED ADULT NURSE REASSESSMENT NOTE - NS ED NURSE REASSESS COMMENT FT1
Received report from Naz RILEY. Patient resting in bed on cardiac monitor. No complaints of pain at this time. Safety and comfort measures provided and maintained.

## 2020-03-25 NOTE — ED ADULT NURSE REASSESSMENT NOTE - NS ED NURSE REASSESS COMMENT FT1
MD Case paged and informed of patient's HR. As per MD Case, intern to come to ED and evaluate patient.

## 2020-03-25 NOTE — H&P ADULT - HISTORY OF PRESENT ILLNESS
86 y/o F with hx of breast ca sp radiation, DM, HTN, HLD presents with c/o difficulty breathing and fever (Tmax:101F) x 1 week. Pt states that she did not take any tylenol today for fever. Pt admits to mild productive cough. Denies chest pain, rash, headache, runny nose, sore throat, bodyaches, recent travel, sick contacts, known covid-19 exposure, abdominal pain, n/v/d or other symptoms. Pt states that she has been on unknown abx for UTI for 1 week and will finish medication tomorrow. States that she also had ct scan of chest last week with oncologist, Dr. Denise and was told that she has a 1.3cm nodule in her lung. States that shortness of breath is worse with movement and is fine when she is resting.     ED Course:   Vitals: Temp 97.7, , /62, RR 18, SPO2 86 on NC  Labs:  Imaging:  S/P: 88 yo F presenting with fever and SOB found to have PNA and possible COVID infection. PMH breast cancer s/p radiation, DM2, HTN, HLD. Patient states that she has been having SOB and cough for about 6-7 days now, she has also been feeling SOB. She states that she was recently being treated for a UTI by her doctor, but no longer has any urinary symptoms. She does not have any associated palpitations, chest pain, headache, nausea, vomiting, abdominal pain, diarrhea, melena, hematochezia.   She does not have any known COVID + contacts.     ED Course:   Vitals: Temp 97.7, , /62, RR 18, SPO2 86 on NC  CXR: possible RLL infiltrate  Troponin elevated

## 2020-03-25 NOTE — ED ADULT NURSE NOTE - NSFALLRSKHRMRISKTYPE_ED_ALL_ED
[facility]  Electrodiagnostic Laboratory  *Accredited by the [address] [place] [address] with exemplary status  [address] [facility]  Phone: [phone]  Fax: [phone]    Referring Provider: Napoleon Cruz MD  Primary Care Physician: Cesia Ramirez DO  Patient Name: Taina Tolentino  Patient YOB: MD Kiran Kaye, DO age(85 years old or older)

## 2020-03-25 NOTE — ED PROVIDER NOTE - OBJECTIVE STATEMENT
88 y/o F with hx of breast ca sp radiation, DM, HTN, HLD presents with c/o difficulty breathing and fever (Tmax:101F) x 1 week. Pt states that she did not take any tylenol today for fever. Pt admits to mild productive cough. Denies chest pain, rash, headache, runny nose, sore throat, bodyaches, recent travel, sick contacts, known covid-19 exposure, abdominal pain, n/v/d or other symptoms. Pt states that she has been on unknown abx for UTI for 1 week and will finish medication tomorrow. States that she also had ct scan of chest last week with oncologist, Dr. Denise and was told that she has a 1.3cm nodule in her lung. States that shortness of breath is worse with movement and is fine when she is resting.   PMD: Dr. Radha Grady

## 2020-03-25 NOTE — H&P ADULT - NSICDXPASTMEDICALHX_GEN_ALL_CORE_FT
PAST MEDICAL HISTORY:  Diabetic nephropathy     Glaucoma     Heart murmur     Hyperlipidemia     Hypertension     Malignant neoplasm of left breast     Morbidly obese BMI 47    Neuropathy bilateral hands    Osteoarthritis     Overactive bladder     Renal insufficiency     Type 2 diabetes mellitus

## 2020-03-25 NOTE — CHART NOTE - NSCHARTNOTEFT_GEN_A_CORE
Called by RN for Pt c/o sustained tachycardia. The patient was seen and examined at bedside. Patient was in bed tachycardic (150's) and tachypneic (RR:54). Patient stated she felt okay, but admitted a little SOB. Denies cp, palpitations, fevers, chills, anxiety. Patient has a history of breast cancer s/p radiation at least 2 years ago per pt.        T(C): 36.7 (03-25-20 @ 20:40), Max: 38.4 (03-25-20 @ 11:37)  HR: 108 (03-25-20 @ 21:00) (108 - 153)  BP: 130/71 (03-25-20 @ 21:00) (114/61 - 161/93)  RR: 20 (03-25-20 @ 21:00) (16 - 20)  SpO2: 100% (03-25-20 @ 21:00) (86% - 100%)  Wt(kg): --    Physical :  Gen- uncomfortable appearing, overweight, appropriate  Cardio - s+1,s+2, tachycardic, no murmur  Lung - decreased bs, no wheeze, no rhonchi, no rales, increased work of breathing on NC  Abdomen- +BS, NT/ND, no guarding, no rebound, no masses  Ext- no edema, 2+ pulses b/l  Neuro- CN grossly intact, nonfocal    LABS:                        13.0   5.67  )-----------( 197      ( 25 Mar 2020 12:25 )             42.0     03-25    147<H>  |  113<H>  |  45<H>  ----------------------------<  188<H>  4.5   |  24  |  1.10    Ca    9.3      25 Mar 2020 12:25    TPro  7.2  /  Alb  2.7<L>  /  TBili  0.3  /  DBili  x   /  AST  58<H>  /  ALT  32  /  AlkPhos  52  03-25        ABG - ( 25 Mar 2020 20:56 )  pH, Arterial: 7.44  pH, Blood: x     /  pCO2: 33    /  pO2: <44   / HCO3: 23    / Base Excess: -1.4  /  SaO2: 72                CARDIAC MARKERS ( 25 Mar 2020 20:58 )  .557 ng/mL / x     / 181 U/L / x     / 3.3 ng/mL  CARDIAC MARKERS ( 25 Mar 2020 14:17 )  .092 ng/mL / x     / x     / x     / x            Assessment/Plan  87y female with pmhx of breast cancer s/p radiation, DM2, HTN, HLD who presented with fever and SOB and admitted PNA and possible COVID infection. Now with tachycardia in 150's.    - Pt afebrile and denying pain and anxiety.  - S/p 1L NS bolus in ED and with BNP of 4353 -- will hold off on more fluids to avoid fluid overload  - STAT EKG showing wide complex EKG  - STAT CE ordered with troponin of 0.557 increased from previous 0.092. Suspected 2/2 demand ischemia as CKMB, CK, and CPK wnl  - STAT ABG ordered however, only VBG was able to be obtained. -- O2sat likely inaccurately low as pt help breath due to pain. VBG otherwise wnl. Pt sating high 90's on 2L NC.  - STAT Cardizem 10mg IVP given -- with HR drop to low 100's -- repeat EKG Sinus tachycardia  - Pt with some improvement in tachypnea (RR:30's). However, in setting of tachypnea, sinus tachycardia, and hx of breast cancer ordered STAT CTA to R/O PE    Dr Nguyen  PGY1  [phone] Called by RN for Pt c/o sustained tachycardia. The patient was seen and examined at bedside. Patient was in bed tachycardic (150's) and tachypneic (RR:54). Patient stated she felt okay, but admitted a little SOB. Denies cp, palpitations, fevers, chills, anxiety. Patient has a history of breast cancer s/p radiation at least 2 years ago per pt.        T(C): 36.7 (03-25-20 @ 20:40), Max: 38.4 (03-25-20 @ 11:37)  HR: 108 (03-25-20 @ 21:00) (108 - 153)  BP: 130/71 (03-25-20 @ 21:00) (114/61 - 161/93)  RR: 20 (03-25-20 @ 21:00) (16 - 20)  SpO2: 100% (03-25-20 @ 21:00) (86% - 100%)  Wt(kg): --    Physical :  Gen- uncomfortable appearing, overweight, appropriate  Cardio - s+1,s+2, tachycardic, no murmur  Lung - decreased bs, no wheeze, no rhonchi, no rales, increased work of breathing on NC  Abdomen- +BS, NT/ND, no guarding, no rebound, no masses  Ext- no edema, 2+ pulses b/l  Neuro- CN grossly intact, nonfocal    LABS:                        13.0   5.67  )-----------( 197      ( 25 Mar 2020 12:25 )             42.0     03-25    147<H>  |  113<H>  |  45<H>  ----------------------------<  188<H>  4.5   |  24  |  1.10    Ca    9.3      25 Mar 2020 12:25    TPro  7.2  /  Alb  2.7<L>  /  TBili  0.3  /  DBili  x   /  AST  58<H>  /  ALT  32  /  AlkPhos  52  03-25        ABG - ( 25 Mar 2020 20:56 )  pH, Arterial: 7.44  pH, Blood: x     /  pCO2: 33    /  pO2: <44   / HCO3: 23    / Base Excess: -1.4  /  SaO2: 72                CARDIAC MARKERS ( 25 Mar 2020 20:58 )  .557 ng/mL / x     / 181 U/L / x     / 3.3 ng/mL  CARDIAC MARKERS ( 25 Mar 2020 14:17 )  .092 ng/mL / x     / x     / x     / x            Assessment/Plan  87y female with pmhx of breast cancer s/p radiation, DM2, HTN, HLD who presented with fever and SOB and admitted PNA and possible COVID infection. Now with tachycardia in 150's.    - Pt afebrile and denying pain and anxiety.  - S/p 1L NS bolus in ED and with BNP of 4353 -- will hold off on more fluids to avoid fluid overload  - STAT EKG showing wide complex EKG  - STAT CE ordered with troponin of 0.557 increased from previous 0.092. Suspected 2/2 demand ischemia as CKMB, CK, and CPK wnl  - STAT ABG ordered however, only VBG was able to be obtained as pt is difficult stick. -- O2sat likely inaccurately low as pt help breath due to pain. VBG otherwise wnl. Pt sating high 90's on 2L NC.  - STAT Cardizem 10mg IVP given -- with HR drop to low 100's -- repeat EKG Sinus tachycardia  - Pt with some improvement in tachypnea (RR:30's). However, in setting of tachypnea, sinus tachycardia, and hx of breast cancer ordered STAT CTA to R/O PE    Dr Nguyen  PGY1  [phone] Called by RN for Pt c/o sustained tachycardia. The patient was seen and examined at bedside. Patient was in bed tachycardic (150's) and tachypneic (RR:54). Patient stated she felt okay, but admitted a little SOB. Denies cp, palpitations, fevers, chills, anxiety. Patient has a history of breast cancer s/p radiation at least 2 years ago per pt.        T(C): 36.7 (03-25-20 @ 20:40), Max: 38.4 (03-25-20 @ 11:37)  HR: 108 (03-25-20 @ 21:00) (108 - 153)  BP: 130/71 (03-25-20 @ 21:00) (114/61 - 161/93)  RR: 20 (03-25-20 @ 21:00) (16 - 20)  SpO2: 100% (03-25-20 @ 21:00) (86% - 100%)  Wt(kg): --    Physical :  Gen- uncomfortable appearing, overweight, appropriate  Cardio - s+1,s+2, tachycardic, no murmur  Lung - decreased bs, no wheeze, no rhonchi, no rales, increased work of breathing on NC  Abdomen- +BS, NT/ND, no guarding, no rebound, no masses  Ext- no edema, 2+ pulses b/l  Neuro- CN grossly intact, nonfocal    LABS:                        13.0   5.67  )-----------( 197      ( 25 Mar 2020 12:25 )             42.0     03-25    147<H>  |  113<H>  |  45<H>  ----------------------------<  188<H>  4.5   |  24  |  1.10    Ca    9.3      25 Mar 2020 12:25    TPro  7.2  /  Alb  2.7<L>  /  TBili  0.3  /  DBili  x   /  AST  58<H>  /  ALT  32  /  AlkPhos  52  03-25        ABG - ( 25 Mar 2020 20:56 )  pH, Arterial: 7.44  pH, Blood: x     /  pCO2: 33    /  pO2: <44   / HCO3: 23    / Base Excess: -1.4  /  SaO2: 72                CARDIAC MARKERS ( 25 Mar 2020 20:58 )  .557 ng/mL / x     / 181 U/L / x     / 3.3 ng/mL  CARDIAC MARKERS ( 25 Mar 2020 14:17 )  .092 ng/mL / x     / x     / x     / x            Assessment/Plan  87y female with pmhx of breast cancer s/p radiation, DM2, HTN, HLD who presented with fever and SOB and admitted PNA and possible COVID infection. Now with tachycardia in 150's.    - Pt afebrile and denying pain and anxiety.  - S/p 1L NS bolus in ED and with BNP of 4353 -- will hold off on more fluids to avoid fluid overload  - STAT EKG showing wide complex tachycardia   - STAT CE ordered with troponin of 0.557 increased from previous 0.092. Likely 2/2 demand ischemia as CKMB, CK, and CPK wnl  - STAT ABG ordered however, only VBG was able to be obtained as pt is difficult stick. -- O2sat likely inaccurately low as pt held breath due to pain. VBG otherwise unremarkable. Pt sating high 90's on 2L NC.  - STAT Cardizem 10mg IVP given -- with HR drop to low 100's, BP wnl-- repeat EKG Sinus tachycardia with no significant st changes  - Noted similar episode of wide complex tachycardia earlier in the day which responded to cardizem IVP  - Patient remains afebrile, denies feeling anxious  - NEWS2 score 8  - Pt with some improvement in tachypnea (RR:30's). However, in setting of tachypnea, sinus tachycardia, acute illness, and hx of breast cancer ordered STAT CTA to R/O PE    Dr Nguyen  PGY1  [phone]

## 2020-03-25 NOTE — ED PROVIDER NOTE - CLINICAL SUMMARY MEDICAL DECISION MAKING FREE TEXT BOX
88 yo F with hx of HTN, DM co sob and fever x 1 week, is on antibiotics for UTI, febrile and tachycardic, hypoxic at 86% RA, will get labs, cultures, cxr, ekg, r/o covid, admit

## 2020-03-25 NOTE — ED PROVIDER NOTE - PROGRESS NOTE DETAILS
Pt examined by ED attending, Dr. Amaya who agreed with disposition and plan. Paged cardiology, awaiting call back. Pt with episode of atrial fib, heart rate In 150s, cardizem 10mg iv given, paged cardio. Pt with episode of atrial fib, heart rate In 150s, cardizem 10mg iv given, NO KNOWN HX OF AFIB, paged cardio. Spoke to hospitalist, Dr. Arita, discussed case and results, accepted admission. Still awaiting to speak to cardiology, paged multiple times.

## 2020-03-25 NOTE — ED ADULT NURSE NOTE - OBJECTIVE STATEMENT
patient came in ED from home BIBA with c/o difficulty breathing and feeling feverish X 3 weeks. patient states she doesn't have thermometer and was not able to check her temp. on arrival patient noted alert and oriented X 4. febrile 101.1F rectally, tachypnea noted, dyspnea on exertion noted. mid chest discomfort noted. on 2 l/nc saturating at 98%-99%. MESERET Stapleton came to see patient at bedside. patient placed on airborne and contact isolation.

## 2020-03-25 NOTE — ED PROVIDER NOTE - ENMT, MLM
Airway patent, Nasal mucosa with dry mucous noted. Mouth with normal mucosa. Throat has no vesicles, no oropharyngeal exudates and uvula is midline.

## 2020-03-25 NOTE — H&P ADULT - NSHPPHYSICALEXAM_GEN_ALL_CORE
T(C): 36.8 (03-25-20 @ 14:47), Max: 38.4 (03-25-20 @ 11:37)  HR: 109 (03-25-20 @ 14:47) (109 - 153)  BP: 154/78 (03-25-20 @ 14:47) (120/62 - 161/93)  RR: 18 (03-25-20 @ 14:47) (18 - 18)  SpO2: 100% (03-25-20 @ 14:47) (86% - 100%)  Wt(kg): --    Physical Exam:   GENERAL: well-groomed, well-developed, NAD  HEENT: head NC/AT; EOM intact, conjunctiva & sclera clear; hearing grossly intact, moist mucous membranes  NECK: supple, no JVD  RESPIRATORY: R sided rhonchi, no rales or wheezes  CARDIOVASCULAR: S1&S2, RRR, no murmurs or gallops  ABDOMEN: soft, non-tender, non-distended, + Bowel sounds x4 quadrants, no guarding, rebound or rigidity  MUSCULOSKELETAL:  no clubbing, cyanosis or edema of all 4 extremities  LYMPH: no cervical lymphadenopathy  VASCULAR: Radial pulses 2+ bilaterally, no varicose veins   SKIN: warm and dry, color normal  NEUROLOGIC: AA&O X3, CN2-12 intact w/ no focal deficits, no sensory loss, motor Strength 5/5 in UE & LE B/L  Psych: Normal mood and affect, normal behavior

## 2020-03-26 VITALS — DIASTOLIC BLOOD PRESSURE: 88 MMHG | SYSTOLIC BLOOD PRESSURE: 122 MMHG | RESPIRATION RATE: 30 BRPM | HEART RATE: 114 BPM

## 2020-03-26 DIAGNOSIS — J12.81 PNEUMONIA DUE TO SARS-ASSOCIATED CORONAVIRUS: ICD-10-CM

## 2020-03-26 LAB
ANION GAP SERPL CALC-SCNC: 20 MMOL/L — HIGH (ref 5–17)
APTT BLD: 35.9 SEC — SIGNIFICANT CHANGE UP (ref 28.5–37)
BASE EXCESS BLDA CALC-SCNC: -10.1 MMOL/L — LOW (ref -2–2)
BASE EXCESS BLDA CALC-SCNC: -15.7 MMOL/L — LOW (ref -2–2)
BASOPHILS # BLD AUTO: 0 K/UL — SIGNIFICANT CHANGE UP (ref 0–0.2)
BASOPHILS NFR BLD AUTO: 0 % — SIGNIFICANT CHANGE UP (ref 0–2)
BLOOD GAS COMMENTS ARTERIAL: SIGNIFICANT CHANGE UP
BUN SERPL-MCNC: 47 MG/DL — HIGH (ref 7–23)
CALCIUM SERPL-MCNC: 9 MG/DL — SIGNIFICANT CHANGE UP (ref 8.5–10.1)
CHLORIDE SERPL-SCNC: 114 MMOL/L — HIGH (ref 96–108)
CO2 SERPL-SCNC: 12 MMOL/L — LOW (ref 22–31)
CREAT SERPL-MCNC: 2.3 MG/DL — HIGH (ref 0.5–1.3)
CRP SERPL-MCNC: 11.32 MG/DL — HIGH (ref 0–0.4)
EOSINOPHIL # BLD AUTO: 0 K/UL — SIGNIFICANT CHANGE UP (ref 0–0.5)
EOSINOPHIL NFR BLD AUTO: 0 % — SIGNIFICANT CHANGE UP (ref 0–6)
ERYTHROCYTE [SEDIMENTATION RATE] IN BLOOD: 23 MM/HR — HIGH (ref 0–20)
FERRITIN SERPL-MCNC: HIGH NG/ML (ref 15–150)
GLUCOSE SERPL-MCNC: 172 MG/DL — HIGH (ref 70–99)
HBA1C BLD-MCNC: 7.1 % — HIGH (ref 4–5.6)
HCO3 BLDA-SCNC: 12 MMOL/L — LOW (ref 23–27)
HCO3 BLDA-SCNC: 17 MMOL/L — LOW (ref 23–27)
HCT VFR BLD CALC: 42.8 % — SIGNIFICANT CHANGE UP (ref 34.5–45)
HGB BLD-MCNC: 12.7 G/DL — SIGNIFICANT CHANGE UP (ref 11.5–15.5)
HOROWITZ INDEX BLDA+IHG-RTO: 100 — SIGNIFICANT CHANGE UP
HOROWITZ INDEX BLDA+IHG-RTO: 100 — SIGNIFICANT CHANGE UP
INR BLD: 1.55 RATIO — HIGH (ref 0.88–1.16)
LDH SERPL L TO P-CCNC: 2339 U/L — HIGH (ref 50–242)
LYMPHOCYTES # BLD AUTO: 0.74 K/UL — LOW (ref 1–3.3)
LYMPHOCYTES # BLD AUTO: 9 % — LOW (ref 13–44)
MCHC RBC-ENTMCNC: 27.3 PG — SIGNIFICANT CHANGE UP (ref 27–34)
MCHC RBC-ENTMCNC: 29.7 GM/DL — LOW (ref 32–36)
MCV RBC AUTO: 91.8 FL — SIGNIFICANT CHANGE UP (ref 80–100)
MONOCYTES # BLD AUTO: 0.25 K/UL — SIGNIFICANT CHANGE UP (ref 0–0.9)
MONOCYTES NFR BLD AUTO: 3 % — SIGNIFICANT CHANGE UP (ref 2–14)
NEUTROPHILS # BLD AUTO: 6.62 K/UL — SIGNIFICANT CHANGE UP (ref 1.8–7.4)
NEUTROPHILS NFR BLD AUTO: 69 % — SIGNIFICANT CHANGE UP (ref 43–77)
NRBC # BLD: SIGNIFICANT CHANGE UP /100 WBCS (ref 0–0)
PCO2 BLDA: 30 MMHG — LOW (ref 32–46)
PCO2 BLDA: 49 MMHG — HIGH (ref 32–46)
PH BLDA: 7.05 — CRITICAL LOW (ref 7.35–7.45)
PH BLDA: 7.32 — LOW (ref 7.35–7.45)
PLATELET # BLD AUTO: 108 K/UL — LOW (ref 150–400)
PO2 BLDA: 338 MMHG — HIGH (ref 74–108)
PO2 BLDA: 84 MMHG — SIGNIFICANT CHANGE UP (ref 74–108)
POTASSIUM SERPL-MCNC: 5.4 MMOL/L — HIGH (ref 3.5–5.3)
POTASSIUM SERPL-SCNC: 5.4 MMOL/L — HIGH (ref 3.5–5.3)
PROTHROM AB SERPL-ACNC: 17.6 SEC — HIGH (ref 10–12.9)
RBC # BLD: 4.66 M/UL — SIGNIFICANT CHANGE UP (ref 3.8–5.2)
RBC # FLD: 15 % — HIGH (ref 10.3–14.5)
SAO2 % BLDA: 88 % — LOW (ref 92–96)
SAO2 % BLDA: 99 % — HIGH (ref 92–96)
SARS-COV-2 RNA SPEC QL NAA+PROBE: DETECTED
SODIUM SERPL-SCNC: 146 MMOL/L — HIGH (ref 135–145)
TROPONIN I SERPL-MCNC: 11 NG/ML — HIGH (ref 0.01–0.04)
WBC # BLD: 8.17 K/UL — SIGNIFICANT CHANGE UP (ref 3.8–10.5)
WBC # FLD AUTO: 8.17 K/UL — SIGNIFICANT CHANGE UP (ref 3.8–10.5)

## 2020-03-26 PROCEDURE — 86140 C-REACTIVE PROTEIN: CPT

## 2020-03-26 PROCEDURE — 87086 URINE CULTURE/COLONY COUNT: CPT

## 2020-03-26 PROCEDURE — 83605 ASSAY OF LACTIC ACID: CPT

## 2020-03-26 PROCEDURE — 82553 CREATINE MB FRACTION: CPT

## 2020-03-26 PROCEDURE — 92950 HEART/LUNG RESUSCITATION CPR: CPT

## 2020-03-26 PROCEDURE — 36600 WITHDRAWAL OF ARTERIAL BLOOD: CPT

## 2020-03-26 PROCEDURE — 82728 ASSAY OF FERRITIN: CPT

## 2020-03-26 PROCEDURE — 83880 ASSAY OF NATRIURETIC PEPTIDE: CPT

## 2020-03-26 PROCEDURE — 71045 X-RAY EXAM CHEST 1 VIEW: CPT

## 2020-03-26 PROCEDURE — 96361 HYDRATE IV INFUSION ADD-ON: CPT

## 2020-03-26 PROCEDURE — 82962 GLUCOSE BLOOD TEST: CPT

## 2020-03-26 PROCEDURE — 80053 COMPREHEN METABOLIC PANEL: CPT

## 2020-03-26 PROCEDURE — 87798 DETECT AGENT NOS DNA AMP: CPT

## 2020-03-26 PROCEDURE — 82803 BLOOD GASES ANY COMBINATION: CPT

## 2020-03-26 PROCEDURE — 82550 ASSAY OF CK (CPK): CPT

## 2020-03-26 PROCEDURE — 71045 X-RAY EXAM CHEST 1 VIEW: CPT | Mod: 26,77

## 2020-03-26 PROCEDURE — 85610 PROTHROMBIN TIME: CPT

## 2020-03-26 PROCEDURE — 87633 RESP VIRUS 12-25 TARGETS: CPT

## 2020-03-26 PROCEDURE — 87040 BLOOD CULTURE FOR BACTERIA: CPT

## 2020-03-26 PROCEDURE — 83615 LACTATE (LD) (LDH) ENZYME: CPT

## 2020-03-26 PROCEDURE — 84145 PROCALCITONIN (PCT): CPT

## 2020-03-26 PROCEDURE — 94002 VENT MGMT INPAT INIT DAY: CPT

## 2020-03-26 PROCEDURE — 85652 RBC SED RATE AUTOMATED: CPT

## 2020-03-26 PROCEDURE — 84100 ASSAY OF PHOSPHORUS: CPT

## 2020-03-26 PROCEDURE — 80076 HEPATIC FUNCTION PANEL: CPT

## 2020-03-26 PROCEDURE — 80048 BASIC METABOLIC PNL TOTAL CA: CPT

## 2020-03-26 PROCEDURE — 71275 CT ANGIOGRAPHY CHEST: CPT

## 2020-03-26 PROCEDURE — 83036 HEMOGLOBIN GLYCOSYLATED A1C: CPT

## 2020-03-26 PROCEDURE — 99291 CRITICAL CARE FIRST HOUR: CPT

## 2020-03-26 PROCEDURE — 71045 X-RAY EXAM CHEST 1 VIEW: CPT | Mod: 26

## 2020-03-26 PROCEDURE — 84484 ASSAY OF TROPONIN QUANT: CPT

## 2020-03-26 PROCEDURE — 85027 COMPLETE CBC AUTOMATED: CPT

## 2020-03-26 PROCEDURE — 85730 THROMBOPLASTIN TIME PARTIAL: CPT

## 2020-03-26 PROCEDURE — 93010 ELECTROCARDIOGRAM REPORT: CPT

## 2020-03-26 PROCEDURE — 81001 URINALYSIS AUTO W/SCOPE: CPT

## 2020-03-26 PROCEDURE — 87635 SARS-COV-2 COVID-19 AMP PRB: CPT

## 2020-03-26 PROCEDURE — 83735 ASSAY OF MAGNESIUM: CPT

## 2020-03-26 PROCEDURE — 96375 TX/PRO/DX INJ NEW DRUG ADDON: CPT

## 2020-03-26 PROCEDURE — 99285 EMERGENCY DEPT VISIT HI MDM: CPT

## 2020-03-26 PROCEDURE — 96374 THER/PROPH/DIAG INJ IV PUSH: CPT

## 2020-03-26 PROCEDURE — 87486 CHLMYD PNEUM DNA AMP PROBE: CPT

## 2020-03-26 PROCEDURE — 87581 M.PNEUMON DNA AMP PROBE: CPT

## 2020-03-26 PROCEDURE — 99221 1ST HOSP IP/OBS SF/LOW 40: CPT

## 2020-03-26 PROCEDURE — 93005 ELECTROCARDIOGRAM TRACING: CPT

## 2020-03-26 PROCEDURE — 36415 COLL VENOUS BLD VENIPUNCTURE: CPT

## 2020-03-26 RX ORDER — ACETAMINOPHEN 500 MG
1000 TABLET ORAL ONCE
Refills: 0 | Status: DISCONTINUED | OUTPATIENT
Start: 2020-03-26 | End: 2020-03-26

## 2020-03-26 RX ORDER — PANTOPRAZOLE SODIUM 20 MG/1
40 TABLET, DELAYED RELEASE ORAL DAILY
Refills: 0 | Status: DISCONTINUED | OUTPATIENT
Start: 2020-03-26 | End: 2020-03-26

## 2020-03-26 RX ORDER — PROPOFOL 10 MG/ML
10 INJECTION, EMULSION INTRAVENOUS
Qty: 1000 | Refills: 0 | Status: DISCONTINUED | OUTPATIENT
Start: 2020-03-26 | End: 2020-03-26

## 2020-03-26 RX ORDER — NOREPINEPHRINE BITARTRATE/D5W 8 MG/250ML
0.04 PLASTIC BAG, INJECTION (ML) INTRAVENOUS
Qty: 8 | Refills: 0 | Status: DISCONTINUED | OUTPATIENT
Start: 2020-03-26 | End: 2020-03-26

## 2020-03-26 RX ORDER — CHLORHEXIDINE GLUCONATE 213 G/1000ML
15 SOLUTION TOPICAL EVERY 12 HOURS
Refills: 0 | Status: DISCONTINUED | OUTPATIENT
Start: 2020-03-26 | End: 2020-03-26

## 2020-03-26 RX ORDER — CHLORHEXIDINE GLUCONATE 213 G/1000ML
1 SOLUTION TOPICAL
Refills: 0 | Status: DISCONTINUED | OUTPATIENT
Start: 2020-03-26 | End: 2020-03-26

## 2020-03-26 RX ORDER — HYDROXYCHLOROQUINE SULFATE 200 MG
200 TABLET ORAL EVERY 12 HOURS
Refills: 0 | Status: DISCONTINUED | OUTPATIENT
Start: 2020-03-26 | End: 2020-03-26

## 2020-03-26 RX ADMIN — CHLORHEXIDINE GLUCONATE 15 MILLILITER(S): 213 SOLUTION TOPICAL at 06:02

## 2020-03-26 RX ADMIN — Medication 5 MILLIGRAM(S): at 00:45

## 2020-03-26 RX ADMIN — PROPOFOL 6.8 MICROGRAM(S)/KG/MIN: 10 INJECTION, EMULSION INTRAVENOUS at 10:07

## 2020-03-26 RX ADMIN — Medication 1: at 06:53

## 2020-03-26 RX ADMIN — HEPARIN SODIUM 5000 UNIT(S): 5000 INJECTION INTRAVENOUS; SUBCUTANEOUS at 06:02

## 2020-03-26 NOTE — PROGRESS NOTE ADULT - SUBJECTIVE AND OBJECTIVE BOX
Interval events:     ****charting in progress*****    T(F): 101.3 (20 @ 08:30), Max: 101.3 (20 @ 08:30)  HR: 112 (20 @ 07:00) (93 - 153)  BP: 100/47 (20 @ 07:00) (100/47 - 161/93)  RR: 30 (20 @ 07:00) (16 - 36)  SpO2: 99% (20 @ 05:12) (86% - 100%)  Wt(kg): --    Mode: AC/ CMV (Assist Control/ Continuous Mandatory Ventilation), RR (machine): 16, TV (machine): 500, FiO2: 50, PEEP: 10    CAPILLARY BLOOD GLUCOSE      POCT Blood Glucose.: 190 mg/dL (26 Mar 2020 06:47)    I&O's Summary    25 Mar 2020 07:01  -  26 Mar 2020 07:00  --------------------------------------------------------  IN: 0 mL / OUT: 0 mL / NET: 0 mL        Physical Exam:     Gen:  Neuro:  HEENT:  CV:  Pulm:  GI:  Ext:  Skin:    Meds:  aspirin enteric coated 81 milliGRAM(s) Oral daily  heparin  Injectable 5000 Unit(s) SubCutaneous every 8 hours    azithromycin  IVPB      azithromycin  IVPB 500 milliGRAM(s) IV Intermittent every 24 hours  cefTRIAXone   IVPB 1000 milliGRAM(s) IV Intermittent every 24 hours  cefTRIAXone   IVPB      hydroxychloroquine 200 milliGRAM(s) Oral every 12 hours    norepinephrine Infusion 0.04 MICROgram(s)/kG/Min IV Continuous <Continuous>    dextrose 40% Gel 15 Gram(s) Oral once PRN  dextrose 50% Injectable 12.5 Gram(s) IV Push once  dextrose 50% Injectable 25 Gram(s) IV Push once  dextrose 50% Injectable 25 Gram(s) IV Push once  glucagon  Injectable 1 milliGRAM(s) IntraMuscular once PRN  insulin lispro (HumaLOG) corrective regimen sliding scale   SubCutaneous three times a day before meals  simvastatin 10 milliGRAM(s) Oral at bedtime      acetaminophen   Tablet .. 650 milliGRAM(s) Oral every 6 hours PRN  acetaminophen   Tablet .. 1000 milliGRAM(s) Oral once PRN  ondansetron Injectable 4 milliGRAM(s) IV Push every 6 hours PRN  propofol Infusion 10 MICROgram(s)/kG/Min IV Continuous <Continuous>    pantoprazole  Injectable 40 milliGRAM(s) IV Push daily        dextrose 5%. 1000 milliLiter(s) IV Continuous <Continuous>      chlorhexidine 0.12% Liquid 15 milliLiter(s) Oral Mucosa every 12 hours  chlorhexidine 4% Liquid 1 Application(s) Topical <User Schedule>                                  12.7   8.17  )-----------( x        ( 26 Mar 2020 08:39 )             42.8           147<H>  |  113<H>  |  45<H>  ----------------------------<  188<H>  4.5   |  24  |  1.10    Ca    9.3      25 Mar 2020 12:25    TPro  7.2  /  Alb  2.7<L>  /  TBili  0.3  /  DBili  x   /  AST  58<H>  /  ALT  32  /  AlkPhos  52      Lactate 1.6            @ 12:25      CARDIAC MARKERS ( 25 Mar 2020 20:58 )  .557 ng/mL / x     / 181 U/L / x     / 3.3 ng/mL  CARDIAC MARKERS ( 25 Mar 2020 14:17 )  .092 ng/mL / x     / x     / x     / x            Urinalysis Basic - ( 25 Mar 2020 22:51 )    Color: Yellow / Appearance: Clear / S.015 / pH: x  Gluc: x / Ketone: Negative  / Bili: Negative / Urobili: Negative   Blood: x / Protein: 100 / Nitrite: Negative   Leuk Esterase: Negative / RBC: 0-2 /HPF / WBC 0-2   Sq Epi: x / Non Sq Epi: Occasional / Bacteria: Moderate          Rapid RVP Result: NotDetec ( @ 12:25)    ABG - ( 26 Mar 2020 05:24 )  pH, Arterial: 7.32  pH, Blood: x     /  pCO2: 30    /  pO2: 338   / HCO3: 17    / Base Excess: -10.1 /  SaO2: 99                  Radiology: ***    Bedside Lung U/S: ***    Bedside Cardiac U/S: ***    CENTRAL LINE: Y/N   DATE INSERTED:   REMOVE: Y/N    SIM: Y/N      DATE INSERTED:        REMOVE: Y/N    A-LINE: Y/N     DATE INSERTED:              REMOVE: Y/N    GLOBAL ISSUE/BEST PRACTICE:  Analgesia:  Sedation:  CAM-ICU:  HOB elevation: yes  Stress ulcer prophylaxis:  VTE prophylaxis:  Glycemic control:  Nutrition:    CODE STATUS: ***

## 2020-03-26 NOTE — PATIENT PROFILE ADULT - NSPROGENSOURCEINFO_GEN_A_NUR
patient/lacks capacity and has no surrogate decision maker lacks capacity and has no surrogate decision maker/unable to respond

## 2020-03-26 NOTE — CHART NOTE - NSCHARTNOTEFT_GEN_A_CORE
Pt was seen by me. Her rhythm went to a wide complex escape rhythm likely ventricular escape. She was no longer capturing with TVP. She had no heart sounds. Given her DNR/DNI status ACLS was not started. TOD was pronounced at 1039 AM on 3/26/2020.

## 2020-03-26 NOTE — PROVIDER CONTACT NOTE (CRITICAL VALUE NOTIFICATION) - ACTION/TREATMENT ORDERED:
CCMD, aware. Patient , no changes
MONITOR
will treat patient as per MD Case, no further orders from MD at this time
No further action at this time

## 2020-03-26 NOTE — CONSULT NOTE ADULT - ATTENDING COMMENTS
I saw and examined the patient personally. Spoke with above provider regarding this case. I reviewed the above findings completely.  I agree with the above history, physical, and plan which I have edited where appropriate.     86 yo F presenting with fever and SOB found to have PNA and possible COVID infection. PMH breast cancer s/p radiation, DM2, HTN, HLD.  In ED apparently was in AF w RVR and then went to PEA post AVN agents. Then ACLS started. Had TVP placed.   Noted to have a right sided ECG showing possible posterior injury.    Will in ICU she was noted to no longer require the TVP. She was -130 and off all pressors.   It appears that she was in multiorgan failure with shock liver, JONATHAN, and resp failure.   Her ABG appeared that she had a metabolic acidosis and was oxygentating well on current settings. Her rhythm then degenerated to a ventricular escape rhythm. She was not able to be paced. She had no pulse and given her DNR/DNI status ACLS was not started and the patient .

## 2020-03-26 NOTE — CONSULT NOTE ADULT - SUBJECTIVE AND OBJECTIVE BOX
Patient is a 87y old  Female who presents with a chief complaint of r/o COVID vs PNA (25 Mar 2020 16:53)    History of Present Illness:   86 yo F presenting with fever and SOB found to have PNA and possible COVID infection. PMH breast cancer s/p radiation, DM2, HTN, HLD. Patient states that she has been having SOB and cough for about 6-7 days now, she has also been feeling SOB. She states that she was recently being treated for a UTI by her doctor, but no longer has any urinary symptoms. She does not have any associated palpitations, chest pain, headache, nausea, vomiting, abdominal pain, diarrhea, melena, hematochezia.   She does not have any known COVID + contacts.     ICU : Called to [facility] in ER. pt found to have a withnessed PEA cardiac arrest. Pt ROSC with 15 min of ACLS .pt within 5 min of ROSC , became bradycardiac with loss of BP . ACLS restarted , Right IJ TVP placed with good capture at 88. pt with ,  pt underwent a CT angio and found to have Pulm infiltrates , chf  , but NO  PE.  pt ECG with RBBB, RAD, but no ischemia , Cardiac markers positive trending, pt right sided ECG with positive ischemia in V1. pt remains intubated and is on no gtts at present with TVP - V paced at 88 .     Events last 24 hours: ***    PAST MEDICAL & SURGICAL HISTORY:  Morbidly obese: BMI 47  Malignant neoplasm of left breast  Diabetic nephropathy  Renal insufficiency  Glaucoma  Neuropathy: bilateral hands  Osteoarthritis  Heart murmur  Overactive bladder  Type 2 diabetes mellitus  Hypertension  Hyperlipidemia  H/O total shoulder replacement, right: 2016  S/P T&A (status post tonsillectomy and adenoidectomy): as child  S/P ORIF (open reduction internal fixation) fracture: left ankle   S/P knee replacement: left   S/P knee replacement: right   S/P carpal tunnel release: right 15 years ago  S/P eye surgery: left eye glaucoma surgery, 4 years ago  S/P cataract extraction: bilateral 7 years ago  History of hip replacement, total, right    Allergies    Percocet 10/325 (Drowsiness)    Intolerances      FAMILY HISTORY:  No pertinent family history in first degree relatives      Review of Systems:  unable to assess due to intubated       Medications:  azithromycin  IVPB      azithromycin  IVPB 500 milliGRAM(s) IV Intermittent every 24 hours  cefTRIAXone   IVPB 1000 milliGRAM(s) IV Intermittent every 24 hours  cefTRIAXone   IVPB        metoprolol succinate ER 25 milliGRAM(s) Oral daily      acetaminophen   Tablet .. 650 milliGRAM(s) Oral every 6 hours PRN  acetaminophen   Tablet .. 1000 milliGRAM(s) Oral once PRN  gabapentin 300 milliGRAM(s) Oral three times a day  ondansetron Injectable 4 milliGRAM(s) IV Push every 6 hours PRN      aspirin enteric coated 81 milliGRAM(s) Oral daily  heparin  Injectable 5000 Unit(s) SubCutaneous every 8 hours      oxybutynin 5 milliGRAM(s) Oral two times a day    dextrose 40% Gel 15 Gram(s) Oral once PRN  dextrose 50% Injectable 12.5 Gram(s) IV Push once  dextrose 50% Injectable 25 Gram(s) IV Push once  dextrose 50% Injectable 25 Gram(s) IV Push once  glucagon  Injectable 1 milliGRAM(s) IntraMuscular once PRN  insulin lispro (HumaLOG) corrective regimen sliding scale   SubCutaneous three times a day before meals  simvastatin 10 milliGRAM(s) Oral at bedtime    dextrose 5%. 1000 milliLiter(s) IV Continuous <Continuous>    Mode: AC/ CMV (Assist Control/ Continuous Mandatory Ventilation)  RR (machine): 16  TV (machine): 500  FiO2: 100  PEEP: 10  ITime: 1  MAP: 13  PIP: 29      ICU Vital Signs Last 24 Hrs  T(C): 36.5 (26 Mar 2020 01:02), Max: 38.4 (25 Mar 2020 11:37)  T(F): 97.7 (26 Mar 2020 01:02), Max: 101.1 (25 Mar 2020 11:37)  HR: 93 (26 Mar 2020 01:50) (93 - 153)  BP: 129/99 (26 Mar 2020 01:02) (114/61 - 161/93)  BP(mean): --  ABP: --  ABP(mean): --  RR: 22 (26 Mar 2020 01:02) (16 - 24)  SpO2: 88% (26 Mar 2020 01:50) (86% - 100%)    Vital Signs Last 24 Hrs  T(C): 36.5 (26 Mar 2020 01:02), Max: 38.4 (25 Mar 2020 11:37)  T(F): 97.7 (26 Mar 2020 01:02), Max: 101.1 (25 Mar 2020 11:37)  HR: 93 (26 Mar 2020 01:50) (93 - 153)  BP: 129/99 (26 Mar 2020 01:02) (114/61 - 161/93)  BP(mean): --  RR: 22 (26 Mar 2020 01:02) (16 - 24)  SpO2: 88% (26 Mar 2020 01:50) (86% - 100%)    ABG - ( 26 Mar 2020 01:51 )  pH, Arterial: 7.05  pH, Blood: x     /  pCO2: 49    /  pO2: 84    / HCO3: 12    / Base Excess: -15.7 /  SaO2: 88          I&O's Detail        LABS:                        13.0   5.67  )-----------( 197      ( 25 Mar 2020 12:25 )             42.0     03-25    147<H>  |  113<H>  |  45<H>  ----------------------------<  188<H>  4.5   |  24  |  1.10    Ca    9.3      25 Mar 2020 12:25    TPro  7.2  /  Alb  2.7<L>  /  TBili  0.3  /  DBili  x   /  AST  58<H>  /  ALT  32  /  AlkPhos  52  03-25      CARDIAC MARKERS ( 25 Mar 2020 20:58 )  .557 ng/mL / x     / 181 U/L / x     / 3.3 ng/mL  CARDIAC MARKERS ( 25 Mar 2020 14:17 )  .092 ng/mL / x     / x     / x     / x          CAPILLARY BLOOD GLUCOSE      POCT Blood Glucose.: 163 mg/dL (25 Mar 2020 18:51)      Urinalysis Basic - ( 25 Mar 2020 22:51 )    Color: Yellow / Appearance: Clear / S.015 / pH: x  Gluc: x / Ketone: Negative  / Bili: Negative / Urobili: Negative   Blood: x / Protein: 100 / Nitrite: Negative   Leuk Esterase: Negative / RBC: 0-2 /HPF / WBC 0-2   Sq Epi: x / Non Sq Epi: Occasional / Bacteria: Moderate      CULTURES:      Physical Examination:    General: in sync with vent . well groomed     HEENT: Pupils equal, reactive to light.  Symmetric.    PULM: Clear to auscultation bilaterally, no significant sputum production    CVS: Regular rate and rhythm, no murmurs, rubs, or gallops    ABD: Soft, nondistended, nontender, normoactive bowel sounds, no masses, obese     EXT: No edema, nontender    SKIN: Warm and well perfused, no rashes noted.    RADIOLOGY: ***    CT : < from: CT Angio Chest w/ IV Cont (20 @ 23:29) >  IMPRESSION:   1.  No pulmonary embolism.    2.  Nonspecific CT findings of pulmonary infection/inflammation versus congestive heart failure and pulmonary edema.  COVID-19 should be excluded.    3.  A right upper lobe pulmonary nodule measures 1.1 x 1.1 cm.  Follow-up chest CT is recommended in threemonths to exclude lesion growth.     4.  Multiple low-attenuation liver lesions measuring up to 3.4 x 2.7 cm cannot be proven to represent simple cysts on this examination..  Ultrasound or MRI evaluation may be obtained as clinically warranted.                MARISELA MURRAY M.D.,ATTENDING RADIOLOGIST    < end of copied text >    CRITICAL CARE TIME SPENT: *** 60  min , Patient with one or more new problems requiring additional work-up/treatment.

## 2020-03-26 NOTE — PATIENT PROFILE ADULT - NSASFALLNEEDSASSIST_GEN_A_NUR
Returned call to patient, who wanted to know if it was okay to have routine eye exam. Advised patient it was ok to get routine eye exam. Patient voiced understanding and appreciation.   yes

## 2020-03-26 NOTE — CHART NOTE - NSCHARTNOTEFT_GEN_A_CORE
Code blue initially called overhead by nursing staff due to unresponsive patient. Patient was earlier evaluated for persistent tachycardia and tachypnea, admitted for r/o COVID. CTA was performed and PE was ruled out. Per RN, patient was AAOx4, conversing when she suddenly became unresponsive. On arrival, patient noted to have no pulse, ACLS initiated. Patient received epi, lidocaine, bicarb, and calcium. Patient successfully intubated. ROSC achieved. However, patient became pulseless again, CPR resumed, transvenous pacer placed, rhythm achieved. Family contacted, [facility] discussion initiated w/ emergency contact Rika Woodson (daughter), Mago Woodson (daughter), Millie Verduzco (daughter) via telephone. Daughters reported being joint HCP. Family agreed to make patient DNR, however, wishes to proceed w/ trial intubation. MOLST form in chart. Patient to be transferred to ICU for management.

## 2020-03-26 NOTE — CONSULT NOTE ADULT - ASSESSMENT
ICU : Called to [facility] in ER. pt found to have a withnessed PEA cardiac arrest. Pt ROSC with 15 min of ACLS .pt within 5 min of ROSC , became bradycardiac with loss of BP . ACLS restarted , Right IJ TVP placed with good capture at 88. pt with ,  pt underwent a CT angio and found to have Pulm infiltrates , chf  , but NO  PE.  pt ECG with RBBB, RAD, but no ischemia , Cardiac markers positive trending, pt right sided ECG with positive ischemia in V1. pt remains intubated and is on no gtts at present with TVP - V paced at 88 .     admit to ICU   cont vent support   off sedation , to assess status. once established , will then start sedation   vent support , monitor PO2, titrate a POx allow . cont ARDS net protocol  Follow up on cultures , covid panel  serial ECG . trend cardiac markers, cardiology consult this am , cont tvp pacing at present  NPO  Correct lytes   ABX for ? pulm infiltrates     d/w EICU attending. ICU : Called to [facility] in ER. pt found to have a withnessed PEA cardiac arrest. Pt ROSC with 15 min of ACLS .pt within 5 min of ROSC , became bradycardiac with loss of BP . ACLS restarted , Right IJ TVP placed with good capture at 88. pt with ,  pt underwent a CT angio and found to have Pulm infiltrates , chf  , but NO  PE.  pt ECG with RBBB, RAD, but no ischemia , Cardiac markers positive trending, pt right sided ECG with positive ischemia in V1. pt remains intubated and is on no gtts at present with TVP - V paced at 88 .     admit to ICU   cont vent support   off sedation , to assess status. once established , will then start sedation   vent support , monitor PO2, titrate a POx allow. cont ARDS net protocol  Follow up on cultures , covid panel  serial ECG . trend cardiac markers, cardiology consult this am , cont tvp pacing at present  NPO  Correct lytes   ABX for ? pulm infiltrates     d/w EICU attending.

## 2020-03-26 NOTE — CONSULT NOTE ADULT - SUBJECTIVE AND OBJECTIVE BOX
HPI:  88 yo F presenting with fever and SOB found to have PNA and possible COVID infection. PMH breast cancer s/p radiation, DM2, HTN, HLD. Patient states that she has been having SOB and cough for about 3/18, she has also been feeling SOB. She states that she was recently being treated for a UTI by her doctor, but no longer has any urinary symptoms. She does not have any associated palpitations, chest pain, headache, nausea, vomiting, abdominal pain, diarrhea, melena, hematochezia.   She does not have any known COVID + contacts.     ED Course:   Vitals: Temp 97.7, , /62, RR 18, SPO2 86 on NC  CXR: possible RLL infiltrate  Troponin elevated (25 Mar 2020 16:53)      PAST MEDICAL & SURGICAL HISTORY:  Morbidly obese: BMI 47  Malignant neoplasm of left breast  Diabetic nephropathy  Renal insufficiency  Glaucoma  Neuropathy: bilateral hands  Osteoarthritis  Heart murmur  Overactive bladder  Type 2 diabetes mellitus  Hypertension  Hyperlipidemia  H/O total shoulder replacement, right:   S/P T&A (status post tonsillectomy and adenoidectomy): as child  S/P ORIF (open reduction internal fixation) fracture: left ankle   S/P knee replacement: left   S/P knee replacement: right   S/P carpal tunnel release: right 15 years ago  S/P eye surgery: left eye glaucoma surgery, 4 years ago  S/P cataract extraction: bilateral 7 years ago  History of hip replacement, total, right      Antimicrobials  azithromycin  IVPB      azithromycin  IVPB 500 milliGRAM(s) IV Intermittent every 24 hours  cefTRIAXone   IVPB 1000 milliGRAM(s) IV Intermittent every 24 hours  cefTRIAXone   IVPB      hydroxychloroquine 200 milliGRAM(s) Oral every 12 hours      Immunological      Other  acetaminophen   Tablet .. 650 milliGRAM(s) Oral every 6 hours PRN  acetaminophen   Tablet .. 1000 milliGRAM(s) Oral once PRN  aspirin enteric coated 81 milliGRAM(s) Oral daily  chlorhexidine 0.12% Liquid 15 milliLiter(s) Oral Mucosa every 12 hours  chlorhexidine 4% Liquid 1 Application(s) Topical <User Schedule>  dextrose 40% Gel 15 Gram(s) Oral once PRN  dextrose 5%. 1000 milliLiter(s) IV Continuous <Continuous>  dextrose 50% Injectable 12.5 Gram(s) IV Push once  dextrose 50% Injectable 25 Gram(s) IV Push once  dextrose 50% Injectable 25 Gram(s) IV Push once  glucagon  Injectable 1 milliGRAM(s) IntraMuscular once PRN  heparin  Injectable 5000 Unit(s) SubCutaneous every 8 hours  insulin lispro (HumaLOG) corrective regimen sliding scale   SubCutaneous three times a day before meals  norepinephrine Infusion 0.04 MICROgram(s)/kG/Min IV Continuous <Continuous>  ondansetron Injectable 4 milliGRAM(s) IV Push every 6 hours PRN  pantoprazole  Injectable 40 milliGRAM(s) IV Push daily  propofol Infusion 10 MICROgram(s)/kG/Min IV Continuous <Continuous>  simvastatin 10 milliGRAM(s) Oral at bedtime      Allergies    Percocet 10/325 (Drowsiness)    Intolerances    SOCIAL HISTORY:  Social History:  Denies use of etoh, tobacco and drug use. (25 Mar 2020 16:53)      FAMILY HISTORY:  No pertinent family history in first degree relatives      ROS:    limited    Vital Signs Last 24 Hrs  T(C): 38.5 (26 Mar 2020 08:30), Max: 38.5 (26 Mar 2020 08:30)  T(F): 101.3 (26 Mar 2020 08:30), Max: 101.3 (26 Mar 2020 08:30)  HR: 115 (26 Mar 2020 09:15) (93 - 153)  BP: 100/47 (26 Mar 2020 07:00) (100/47 - 161/93)  BP(mean): 68 (26 Mar 2020 07:00) (68 - 126)  RR: 30 (26 Mar 2020 07:00) (16 - 36)  SpO2: 99% (26 Mar 2020 05:12) (86% - 100%)    PE:    HEENT:  NC, atraumaticlesions, erythema or exudate  Lungs:  No accessory muscle use, breathing comfortably  Cor:  distant  Abd:  Symmetric, appears normal,   Extrem:  No cyanosis      LABS:                        12.7   8.17  )-----------( x        ( 26 Mar 2020 08:39 )             42.8       WBC Count: 8.17 K/uL (20 @ 08:39)  WBC Count: 5.67 K/uL (20 @ 12:25)          146<H>  |  114<H>  |  47<H>  ----------------------------<  172<H>  5.4<H>   |  12<L>  |  2.30<H>    Ca    9.0      26 Mar 2020 08:39  Phos  7.7       Mg     2.0         TPro  6.4  /  Alb  2.2<L>  /  TBili  0.7  /  DBili  .30<H>  /  AST  1164<H>  /  ALT  349<H>  /  AlkPhos  166<H>        Creatinine, Serum: 2.30 mg/dL (20 @ 08:39)  Creatinine, Serum: 1.10 mg/dL (20 @ 12:25)      Urinalysis Basic - ( 25 Mar 2020 22:51 )    Color: Yellow / Appearance: Clear / S.015 / pH: x  Gluc: x / Ketone: Negative  / Bili: Negative / Urobili: Negative   Blood: x / Protein: 100 / Nitrite: Negative   Leuk Esterase: Negative / RBC: 0-2 /HPF / WBC 0-2   Sq Epi: x / Non Sq Epi: Occasional / Bacteria: Moderate            COVID RISK SCORE:  Auto Neutrophil #: 4.50 K/uL (20 @ 12:25)  Auto Lymphocyte #: 0.61 K/uL (20 @ 12:25)    Lactate, Blood: 1.6 mmol/L (20 @ 12:25)    Auto Eosinophil #: 0.00 K/uL (20 @ 12:25)      Sedimentation Rate, Erythrocyte: 23 mm/hr (20 @ 08:39)      Troponin I, Serum: 11.000 ng/mL (20 @ 08:39)  Troponin I, Serum: .557 ng/mL (20 @ 20:58)  Troponin I, Serum: .092 ng/mL (20 @ 14:17)    Creatine Kinase, Serum: 436 U/L (20 @ 08:39)  Creatine Kinase, Serum: 181 U/L (20 @ 20:58)        INR: 1.55 ratio (20 @ 08:39)  Activated Partial Thromboplastin Time: 35.9 sec (20 @ 08:39)          MICROBIOLOGY:      RADIOLOGY & ADDITIONAL STUDIES:    --< from: Xray Chest 1 View- PORTABLE-Urgent (20 @ 06:20) >    EXAM:  XR CHEST PORTABLE URGENT 1V                            PROCEDURE DATE:  2020          INTERPRETATION:  Clinical information: Endotracheal tube adjustment.    TECHNIQUE: Frontal view of the chest.    COMPARISON: Prior chest x-ray examination from 3/26/2020 at 2:12 AM.    Finding: There has been interval adjustment of the endotracheal tube with the endotracheal tube tip now approximately 5.1 cm above the robbie at the level of the clavicles. A right-sided IJ approach pacing wire is noted.    Patchy bilateral interstitial lung opacities appears similar. The heart size is at the upper limits of normal. Multilevel degenerative changes are noted within the imaged potions of the spine. A right-sided reverse shoulder arthroplasty is again seen.    IMPRESSION: Interval adjustment of the endotracheal tube with the tip now approximately 5.1 cm above the robbie at the level of the clavicles.    Unchanged bilateral airspace disease.    < end of copied text >

## 2020-03-26 NOTE — CHART NOTE - NSCHARTNOTEFT_GEN_A_CORE
Patient s/p cardiac arrest x 2, w/ ROSC achieved via ACS.  See event note.    GOC discussion initiated w/ emergency contact Rikabertram Woodson (daughter), Mago Woodson (daughter), Millie Verduzco (daughter) via telephone conference call.  They report being joint HCP.  No documentation in chart to confirm.    Given repeated cardiac arrest, pneumonia and comorbidities family would like to make patient DNR, however proceed w/ trial intubation.  MOLST completed and witnessed and placed in chart.  Dr. Case made aware; ICU PA made aware.  further GOC conversation to be addressed pending clinical course

## 2020-03-26 NOTE — CONSULT NOTE ADULT - ASSESSMENT
86 yo F w PMH sig for breast cancer s/p radiation, DM2, HTN, HLD .found to be COVID +    COVID-19---high risk period for decompensation  (7-14 days post symptom onset), so critical to determine date of symptom onset, rec avoid aerosolizing procedures,  steroids, NSAIDs --DO NOT recommend any specific therapies outside of established protocols or controlled trials  focus on supportive care  avoid excessive blood draws but do recommend for hospitalized patients  -For all pateints: daily CBC w diff to follow eos, lymphocytes and neutrophils and daily   NLR calculation (NLR <3 low vs >5 high) -other labs that may be selectively used to risk stratify and predict clinical course,   Procalcitonin (<0.2 associated with more severe disease),    Ferritin (lower risk <450 vs >850)   CRP (low risk <2 and higher risk >6)   D-dimer (<1,000 vs >1,000)   and if prognosis is unclear or if immunomodulators being considered: LDH, ESR, PT/PTT, CK, total, CKMB mass assay, lactate, troponin, IL-6 (and other interleukins as indicated)  -antibiotics only if there is concern for a bacterial process  -baseline and dynamic neutrophil lymphocyte ratio can risk stratify as age <50 and stable NLR <3 are low risk     3/18 symptom onset (window 3/24-3/31)  3/25 intubated NLR 4.5/0.61 = 7.4 so as already apparent high risk severely ill patient 88 yo F w PMH sig for breast cancer s/p radiation, DM2, HTN, HLD .very high prob clinical scenario for COVID +    COVID-19---high risk period for decompensation  (7-14 days post symptom onset), so critical to determine date of symptom onset, rec avoid aerosolizing procedures,  steroids, NSAIDs --DO NOT recommend any specific therapies outside of established protocols or controlled trials  focus on supportive care  avoid excessive blood draws but do recommend for hospitalized patients  -For all pateints: daily CBC w diff to follow eos, lymphocytes and neutrophils and daily   NLR calculation (NLR <3 low vs >5 high) -other labs that may be selectively used to risk stratify and predict clinical course,   Procalcitonin (<0.2 associated with more severe disease),    Ferritin (lower risk <450 vs >850)   CRP (low risk <2 and higher risk >6)   D-dimer (<1,000 vs >1,000)   and if prognosis is unclear or if immunomodulators being considered: LDH, ESR, PT/PTT, CK, total, CKMB mass assay, lactate, troponin, IL-6 (and other interleukins as indicated)  -antibiotics only if there is concern for a bacterial process  -baseline and dynamic neutrophil lymphocyte ratio can risk stratify as age <50 and stable NLR <3 are low risk     3/18 symptom onset (window 3/24-3/31)  3/25 intubated NLR 4.5/0.61 = 7.4 so as already apparent high risk severely ill patient

## 2020-03-27 LAB
CULTURE RESULTS: SIGNIFICANT CHANGE UP
SPECIMEN SOURCE: SIGNIFICANT CHANGE UP

## 2020-04-01 LAB
CULTURE RESULTS: SIGNIFICANT CHANGE UP
CULTURE RESULTS: SIGNIFICANT CHANGE UP
SPECIMEN SOURCE: SIGNIFICANT CHANGE UP
SPECIMEN SOURCE: SIGNIFICANT CHANGE UP

## 2020-04-30 NOTE — DISCHARGE NOTE FOR THE EXPIRED PATIENT - HOSPITAL COURSE
86 yo F presenting with fever and SOB found to have PNA and possible COVID infection. PMH breast cancer s/p radiation, DM2, HTN, HLD. Patient states that she has been having SOB and cough for about 6-7 days now, she has also been feeling SOB. She states that she was recently being treated for a UTI by her doctor, but no longer has any urinary symptoms. She does not have any associated palpitations, chest pain, headache, nausea, vomiting, abdominal pain, diarrhea, melena, hematochezia.   She does not have any known COVID + contacts.     ICU : Called to [facility] in ER. pt found to have a withnessed PEA cardiac arrest. Pt ROSC with 15 min of ACLS .pt within 5 min of ROSC , became bradycardiac with loss of BP . ACLS restarted , Right IJ TVP placed with good capture at 88. pt with ,  pt underwent a CT angio and found to have Pulm infiltrates , chf  , but NO  PE.  pt ECG with RBBB, RAD, but no ischemia , Cardiac markers positive trending, pt right sided ECG with positive ischemia in V1. pt remains intubated and is on no gtts at present with TVP - V paced at 88 .     later went into [facility]. then asystole.

## 2020-05-01 NOTE — CONSULT NOTE ADULT - PROBLEM SELECTOR RECOMMENDATION 9
The Patient, Khris Kowalski was called today regarding the patient's participation in (IRB #[id number] PI: Laura).   The Verbal Informed Consent was read and discussed by the consenter. The following was discussed:   Types of specimens to be collected   All medical information released to researchers will be stripped of identifiers and no patient information will be given to anyone outside of this research project.    Participating in a research study is not the same as getting regular medical care and will not improve the patient's health. The purpose of a research study is to gather information.  Being in this study does not interfere with your regular medical care.   The patient does not have to participate in this study. If they do not join, their care at [facility] will not be affected.  The person granting permission was provided adequate time to ask questions regarding the scope and purpose of the study.  Permission was obtained by telephone.   The above statements were read by the person obtaining permission to the person granting permission and witnessed by Janie Owens, who also confirmed the patient's consent to the study. The witness information was documented on the verbal consent form as well.  This Verbal Informed Consent process was conducted prior to initiation of any study procedures.   Thank you for consulting us and involving us in the management of this most interesting and challenging case. I certainly appreciate the challenges, stress and sacrifice during these difficult and challenging times.  We will follow along in the care of this patient.

## 2020-05-07 ENCOUNTER — APPOINTMENT (OUTPATIENT)
Dept: SURGICAL ONCOLOGY | Facility: CLINIC | Age: 85
End: 2020-05-07

## 2021-05-20 NOTE — PHYSICAL EXAM
Pt back in ED from US and XRAY. Pt up to bathroom with a steady gait.    [Both Tympanic Membranes Were Examined] : both tympanic membranes were normal [Sclera] : the sclera and conjunctiva were normal [Abdomen Soft] : soft [Nondistended] : nondistended [Normal] : normal external genitalia [Breast Palpation Mass] : no palpable masses [Breast Abnormal Lactation (Galactorrhea)] : no nipple discharge [Abdomen Tenderness] : non-tender [Normal] : no focal deficits [de-identified] : left breast hyperpigmentation, mild edema; stable seroma in area of surgical cavity

## 2021-09-28 NOTE — REVIEW OF SYSTEMS
[Negative] : Endocrine Burow's Advancement Flap Text: The defect edges were debeveled with a #15 scalpel blade.  Given the location of the defect and the proximity to free margins a Burow's advancement flap was deemed most appropriate.  Using a sterile surgical marker, the appropriate advancement flap was drawn incorporating the defect and placing the expected incisions within the relaxed skin tension lines where possible.    The area thus outlined was incised deep to adipose tissue with a #15 scalpel blade.  The skin margins were undermined to an appropriate distance in all directions utilizing iris scissors.

## 2025-01-29 NOTE — PATIENT PROFILE ADULT - NSPROPASSIVESMOKEEXPOSURE_GEN_A_NUR
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  Na was 136 on discharge from rehab    Na 139 on 1/29      Plan  - Was taking PO sodium tabs 1 TID and recently ran out.   - Hold for now   - Monitor Na daily      Unknown
